# Patient Record
Sex: FEMALE | Race: WHITE | NOT HISPANIC OR LATINO | Employment: FULL TIME | URBAN - METROPOLITAN AREA
[De-identification: names, ages, dates, MRNs, and addresses within clinical notes are randomized per-mention and may not be internally consistent; named-entity substitution may affect disease eponyms.]

---

## 2017-10-13 ENCOUNTER — ALLSCRIPTS OFFICE VISIT (OUTPATIENT)
Dept: OTHER | Facility: OTHER | Age: 47
End: 2017-10-13

## 2017-10-13 ENCOUNTER — GENERIC CONVERSION - ENCOUNTER (OUTPATIENT)
Dept: OTHER | Facility: OTHER | Age: 47
End: 2017-10-13

## 2017-10-13 ENCOUNTER — APPOINTMENT (OUTPATIENT)
Dept: RADIOLOGY | Facility: CLINIC | Age: 47
End: 2017-10-13
Payer: COMMERCIAL

## 2017-10-13 DIAGNOSIS — M54.2 CERVICALGIA: ICD-10-CM

## 2017-10-13 PROCEDURE — 72040 X-RAY EXAM NECK SPINE 2-3 VW: CPT

## 2018-01-15 NOTE — RESULT NOTES
Verified Results  (LC) PapIG, HPV, rfx 16/18 [id number] 53:10HB Sonja Mason   No  of containers  Yunior Nuñez 01 CYTYC Thin Prep Vial     Test Name Result Flag Reference   DIAGNOSIS: Comment     NEGATIVE FOR INTRAEPITHELIAL LESION AND MALIGNANCY  THE CYTOLOGY PROCESSING WAS PERFORMED AT THE [facility] LOCATED AT  [facility], [address] [place] [address], [address] [place] [id number]  Specimen adequacy: Comment     Satisfactory for evaluation  Endocervical and/or squamous metaplastic  cells (endocervical component) are present  Clinician provided ICD10: Comment     Z01 419   Performed by: Yajaira Mcdaniel Cytotechnologist (ASCP)     Yunior Nuñez Note: Comment     The Pap smear is a screening test designed to aid in the detection of  premalignant and malignant conditions of the uterine cervix  It is not a  diagnostic procedure and should not be used as the sole means of detecting  cervical cancer  Both false-positive and false-negative reports do occur  Test Methodology: Comment     This liquid based ThinPrep(R) pap test was screened with the  use of an image guided system  HPV, high-risk Negative  Negative   This high-risk HPV test detects thirteen high-risk types  (16/18/31/33/35/39/45/51/52/56/58/59/68) without differentiation

## 2018-01-22 VITALS
SYSTOLIC BLOOD PRESSURE: 128 MMHG | HEIGHT: 62 IN | WEIGHT: 170 LBS | DIASTOLIC BLOOD PRESSURE: 82 MMHG | BODY MASS INDEX: 31.28 KG/M2 | HEART RATE: 83 BPM

## 2018-04-17 ENCOUNTER — ANNUAL EXAM (OUTPATIENT)
Dept: OBGYN CLINIC | Facility: CLINIC | Age: 48
End: 2018-04-17
Payer: COMMERCIAL

## 2018-04-17 VITALS
BODY MASS INDEX: 34.12 KG/M2 | HEIGHT: 62 IN | SYSTOLIC BLOOD PRESSURE: 138 MMHG | WEIGHT: 185.4 LBS | DIASTOLIC BLOOD PRESSURE: 86 MMHG

## 2018-04-17 DIAGNOSIS — N32.81 OAB (OVERACTIVE BLADDER): ICD-10-CM

## 2018-04-17 DIAGNOSIS — Z12.39 SCREENING FOR MALIGNANT NEOPLASM OF BREAST: ICD-10-CM

## 2018-04-17 DIAGNOSIS — Z01.419 WELL WOMAN EXAM WITH ROUTINE GYNECOLOGICAL EXAM: Primary | ICD-10-CM

## 2018-04-17 PROBLEM — M54.2 NECK PAIN: Status: ACTIVE | Noted: 2017-10-13

## 2018-04-17 PROCEDURE — 99396 PREV VISIT EST AGE 40-64: CPT | Performed by: OBSTETRICS & GYNECOLOGY

## 2018-04-17 RX ORDER — OXYBUTYNIN CHLORIDE 10 MG/1
10 TABLET, EXTENDED RELEASE ORAL
Qty: 90 TABLET | Refills: 3 | Status: SHIPPED | OUTPATIENT
Start: 2018-04-17 | End: 2019-02-20

## 2018-04-17 RX ORDER — FAMOTIDINE 40 MG/1
TABLET, FILM COATED ORAL
COMMUNITY
End: 2019-10-22 | Stop reason: ALTCHOICE

## 2018-04-17 RX ORDER — LORATADINE 10 MG/1
CAPSULE, LIQUID FILLED ORAL DAILY
COMMUNITY
End: 2019-10-22 | Stop reason: ALTCHOICE

## 2018-04-17 RX ORDER — EPINEPHRINE 0.3 MG/.3ML
INJECTION SUBCUTANEOUS
COMMUNITY
Start: 2016-04-05 | End: 2019-10-22 | Stop reason: ALTCHOICE

## 2018-04-17 NOTE — PROGRESS NOTES
ASSESSMENT & PLAN: Ana Paula Reyes is a 56 y o  No obstetric history on file  with normal gynecologic exam     1   Routine well woman exam done today  2   Pap and HPV:Pap with HPV was not done today  Current ASCCP Guidelines reviewed  3   Mammogram ordered  Recommend yearly mammography  4   The patient declined STD testing  No testing performed  Safe sex practices have been discussed  5  The patient is sexually active  She relies on partner's vasectomy for contraception  6  The following were reviewed in today's visit: breast self exam, mammography screening ordered, exercise and healthy diet  7  Patient to return to office in 12 months for annual exam    8  Overactive bladder - will send in RX - oxybutynin  All questions have been answered to her satisfaction  CC:  Annual Gynecologic Examination    HPI: Ana Paula Reyes is a 56 y o  No obstetric history on file  who presents for annual gynecologic examination  She has the following concerns:  None  Health Maintenance:    She exercises 5 days per week  She wears her seatbelt routinely  She does not perform regular monthly self breast exams  She feels safe at home  Patients does not always follow a balanced diet  Past Medical History:   Diagnosis Date    Abnormal Pap smear of cervix     Asthma     Depression     Migraine        Past Surgical History:   Procedure Laterality Date     SECTION      SINUS SURGERY  2000    TONSILLECTOMY      in 25s       Past OB/Gyn History:  Period Cycle (Days): 30  Period Duration (Days): 5-7  Period Pattern: Regular  Menstrual Flow: Heavy, Moderate  Menstrual Control: Maxi pad, Tampon  Dysmenorrhea: (!) Moderate  Dysmenorrhea Symptoms: HeadachePatient's last menstrual period was 2018       Menstrual History:  OB History      Para Term  AB Living    2 2 2          SAB TAB Ectopic Multiple Live Births                       Patient's last menstrual period was 03/29/2018  Period Cycle (Days): 30  Period Duration (Days): 5-7  Period Pattern: Regular  Menstrual Flow: Heavy, Moderate  Menstrual Control: Maxi pad, Tampon  Dysmenorrhea: (!) Moderate  Dysmenorrhea Symptoms: Headache    History of sexually transmitted infection No  Patient is currently sexually active: heterosexual  Birth control: vasectomy  Last Pap  2016:  no abnormalities; HPV negative    Family History:  Family History   Problem Relation Age of Onset    Hypertension Mother     Diabetes Paternal Grandmother     Diabetes Paternal Grandfather        Social History:  Social History     Social History    Marital status: /Civil Union     Spouse name: N/A    Number of children: N/A    Years of education: N/A     Occupational History    Not on file  Social History Main Topics    Smoking status: Former Smoker    Smokeless tobacco: Never Used    Alcohol use Yes      Comment: social    Drug use: No    Sexual activity: Yes     Partners: Male     Birth control/ protection: Male Sterilization     Other Topics Concern    Not on file     Social History Narrative    No narrative on file     Presently lives with   Patient is   Patient is currently employed  Allergies:   Allergies   Allergen Reactions    Aspirin Allergic Rhinitis    Citric Acid Anhydrous [Citric Acid] Allergic Rhinitis    Corn-Containing Products Allergic Rhinitis    Ibuprofen Allergic Rhinitis    Isoflavones Allergic Rhinitis    Naproxen Allergic Rhinitis       Medications:    Current Outpatient Prescriptions:     EPINEPHrine (EPIPEN 2-JUANITO) 0 3 mg/0 3 mL SOAJ, Inject as directed, Disp: , Rfl:     famotidine (PEPCID) 40 MG tablet, Take by mouth, Disp: , Rfl:     Loratadine (CLARITIN) 10 MG CAPS, Take by mouth daily, Disp: , Rfl:     Triamcinolone Acetonide (NASACORT AQ NA), into each nostril, Disp: , Rfl:     Review of Systems:  A complete review of systems was performed and was negative, except as listed  Denies weight changes, excessive fatigue, breast lumps or changes, urinary incontinence, urinary frequency, constipation or bowel changes, blood in stool, severe headaches, vaginal bleeding, vaginal discharge  Struggling with weight gain - wasn't working out with plantar fascitis, doing light exercise  Physical Exam:  /86   Ht 5' 1 5" (1 562 m)   Wt 84 1 kg (185 lb 6 4 oz)   LMP 03/29/2018   BMI 34 46 kg/m²    GEN: The patient was alert and oriented x3, pleasant well-appearing female in no acute distress  HEENT:  Unremarkable, no anterior or posterior lymphadenopathy, no thyromegaly  CV:  RRR, no murmurs  RESP:  Clear to auscultation bilaterally  BREAST:  Symmetric breasts with no palpable breast masses or obvious breast lesions  She has no retractions or nipple discharge  She has no axillary abnormalities or palpable masses  Self breast exam is taught  ABD:  Soft, nontender, non-distended  EXT: nontender, no edema  PELVIC:  Normal appearing external female genitalia, normal vaginal epithelium, No discharge  Cervix present   Bimanual: absent CMT,  normal uterus, non-tender  No palpable adnexal masses  Pap was not collected

## 2018-05-22 ENCOUNTER — OFFICE VISIT (OUTPATIENT)
Dept: FAMILY MEDICINE CLINIC | Facility: CLINIC | Age: 48
End: 2018-05-22
Payer: COMMERCIAL

## 2018-05-22 VITALS
BODY MASS INDEX: 34.23 KG/M2 | SYSTOLIC BLOOD PRESSURE: 124 MMHG | RESPIRATION RATE: 16 BRPM | HEIGHT: 62 IN | DIASTOLIC BLOOD PRESSURE: 76 MMHG | HEART RATE: 84 BPM | WEIGHT: 186 LBS | TEMPERATURE: 98.3 F

## 2018-05-22 DIAGNOSIS — Z13.6 SCREENING FOR CARDIOVASCULAR CONDITION: ICD-10-CM

## 2018-05-22 DIAGNOSIS — R53.83 FATIGUE, UNSPECIFIED TYPE: ICD-10-CM

## 2018-05-22 DIAGNOSIS — Z00.00 ANNUAL PHYSICAL EXAM: Primary | ICD-10-CM

## 2018-05-22 PROCEDURE — 99396 PREV VISIT EST AGE 40-64: CPT | Performed by: FAMILY MEDICINE

## 2018-05-22 NOTE — PROGRESS NOTES
FAMILY PRACTICE HEALTH MAINTENANCE OFFICE VISIT  [facility] - [address]    NAME: Malik Goldsmith  AGE: 53 y o  SEX: female  : 1970     DATE: 2018    Assessment and Plan     Problem List Items Addressed This Visit     Fatigue    Relevant Orders    CBC    TSH, 3rd generation    Lyme Antibody Profile with reflex to WB      Other Visit Diagnoses     Annual physical exam    -  Primary    Screening for cardiovascular condition        Relevant Orders    Comprehensive metabolic panel    Lipid Panel with Direct LDL reflex            · Patient Counseling:   · Nutrition: Stressed importance of a well balanced diet, moderation of sodium/saturated fat, caloric balance and sufficient intake of fiber  · Exercise: Stressed the importance of regular exercise with a goal of 150 minutes per week  · Dental Health: Discussed daily flossing and brushing and regular dental visits     · Immunizations reviewed  Up to date  · Discussed benefits of screening mammogram, she just had it     · She has been struggling with her weight due to not being able to exercise from her foot pain  Return in about 1 year (around 2019) for Annual physical         Chief Complaint     Chief Complaint   Patient presents with    Physical Exam     lj       History of Present Illness     She is feeling well other than foot pain  She is seeing a podiatrist regarding it  Well Adult Physical   Patient here for a comprehensive physical exam       Diet and Physical Activity  Diet: well balanced diet  Weight concerns: Patient has class 1 obesity (BMI 30-34  9)  Exercise: frequently      Depression Screen  PHQ-9 Depression Screening    PHQ-9:    Frequency of the following problems over the past two weeks:       Little interest or pleasure in doing things:  1 - several days  Feeling down, depressed, or hopeless:  1 - several days  Trouble falling or staying asleep, or sleeping too much:  1 - several days  Feeling tired or having little energy:  1 - several days  Poor appetite or overeatin - not at all  Feeling bad about yourself - or that you are a failure or have let yourself or your family down:  0 - not at all  Trouble concentrating on things, such as reading the newspaper or watching television:  0 - not at all  Moving or speaking so slowly that other people could have noticed  Or the opposite - being so fidgety or restless that you have been moving around a lot more than usual:  0 - not at all  Thoughts that you would be better off dead, or of hurting yourself in some way:  0 - not at all  PHQ-2 Score:  2  PHQ-9 Score:  4          General Health  Hearing: Normal:  bilateral  Vision: wears glasses  Dental: regular dental visits    Reproductive Health  Recently had her mamm[facility] at [facility]  The following portions of the patient's history were reviewed and updated as appropriate: allergies, current medications, past family history, past medical history, past social history, past surgical history and problem list     Review of Systems     Review of Systems   Constitutional: Positive for fatigue  Respiratory: Negative  Cardiovascular: Negative  Musculoskeletal: Negative for arthralgias         Past Medical History     Past Medical History:   Diagnosis Date    Abnormal Pap smear of cervix     Asthma     Depression     Migraine        Past Surgical History     Past Surgical History:   Procedure Laterality Date     SECTION      SINUS SURGERY  2000    TONSILLECTOMY      in 25s       Social History     Social History     Social History    Marital status: /Civil Union     Spouse name: N/A    Number of children: N/A    Years of education: N/A     Social History Main Topics    Smoking status: Former Smoker    Smokeless tobacco: Never Used    Alcohol use Yes      Comment: social    Drug use: No    Sexual activity: Yes     Partners: Male     Birth control/ protection: Male Sterilization     Other Topics Concern    None     Social History Narrative    Exercise habits: denied        Family History     Family History   Problem Relation Age of Onset    Hypertension Mother     Brain cancer Father     Diabetes Paternal Grandmother     Diabetes Paternal Grandfather        Current Medications       Current Outpatient Prescriptions:     EPINEPHrine (EPIPEN 2-JUANITO) 0 3 mg/0 3 mL SOAJ, Inject as directed, Disp: , Rfl:     famotidine (PEPCID) 40 MG tablet, Take by mouth, Disp: , Rfl:     Loratadine (CLARITIN) 10 MG CAPS, Take by mouth daily, Disp: , Rfl:     oxybutynin (DITROPAN-XL) 10 MG 24 hr tablet, Take 1 tablet (10 mg total) by mouth daily at bedtime, Disp: 90 tablet, Rfl: 3    Triamcinolone Acetonide (NASACORT AQ NA), into each nostril, Disp: , Rfl:      Allergies     Allergies   Allergen Reactions    Aspirin Allergic Rhinitis    Citric Acid Anhydrous [Citric Acid] Allergic Rhinitis    Corn-Containing Products Allergic Rhinitis    Ibuprofen Allergic Rhinitis    Isoflavones Allergic Rhinitis    Naproxen Allergic Rhinitis       Objective     /76   Pulse 84   Temp 98 3 °F (36 8 °C)   Resp 16   Ht 5' 2" (1 575 m)   Wt 84 4 kg (186 lb)   LMP 05/18/2018   BMI 34 02 kg/m²      Physical Exam   Constitutional: She is oriented to person, place, and time  She appears well-developed and well-nourished  HENT:   Head: Normocephalic and atraumatic  Right Ear: External ear normal    Left Ear: External ear normal    Mouth/Throat: Oropharynx is clear and moist    Eyes: Pupils are equal, round, and reactive to light  Neck: Normal range of motion  Cardiovascular: Normal rate, regular rhythm and normal heart sounds  Exam reveals no friction rub  No murmur heard  Pulmonary/Chest: Effort normal and breath sounds normal  No respiratory distress  She has no wheezes  She has no rales  Abdominal: Soft  Bowel sounds are normal  She exhibits no distension and no mass   There is no tenderness  There is no rebound and no guarding  Musculoskeletal: Normal range of motion  She exhibits no edema  Neurological: She is alert and oriented to person, place, and time  She has normal reflexes  Skin: Skin is warm  Nursing note and vitals reviewed          Vision declined - follows with eye doctor    Health Maintenance     Health Maintenance   Topic Date Due    HIV SCREENING  1970    MAMMOGRAM  1970    PNEUMOCOCCAL POLYSACCHARIDE VACCINE AGE 2-64 HIGH RISK  04/24/1973    Depression Screening PHQ-9  04/24/1983    DTaP,Tdap,and Td Vaccines (1 - Tdap) 04/24/1992    INFLUENZA VACCINE  09/01/2018    PAP SMEAR  10/27/2019     Immunization History   Administered Date(s) Administered    TD (adult) Preservative Free 06/18/2012       Madison Wilson   [address] \[facility] [place] [facility]\""

## 2019-02-20 ENCOUNTER — APPOINTMENT (EMERGENCY)
Dept: RADIOLOGY | Facility: HOSPITAL | Age: 49
End: 2019-02-20
Payer: COMMERCIAL

## 2019-02-20 ENCOUNTER — HOSPITAL ENCOUNTER (EMERGENCY)
Facility: HOSPITAL | Age: 49
Discharge: HOME | End: 2019-02-20
Attending: EMERGENCY MEDICINE | Admitting: EMERGENCY MEDICINE
Payer: COMMERCIAL

## 2019-02-20 VITALS
BODY MASS INDEX: 32.57 KG/M2 | RESPIRATION RATE: 18 BRPM | DIASTOLIC BLOOD PRESSURE: 67 MMHG | HEART RATE: 81 BPM | OXYGEN SATURATION: 100 % | SYSTOLIC BLOOD PRESSURE: 125 MMHG | WEIGHT: 177 LBS | TEMPERATURE: 98.6 F | HEIGHT: 62 IN

## 2019-02-20 DIAGNOSIS — S01.01XA SCALP LACERATION: Primary | ICD-10-CM

## 2019-02-20 DIAGNOSIS — R42 VERTIGO: ICD-10-CM
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
## 2019-02-20 PROCEDURE — 96375 TX/PRO/DX INJ NEW DRUG ADDON: CPT

## 2019-02-20 PROCEDURE — 70450 CT HEAD/BRAIN W/O DYE: CPT

## 2019-02-20 PROCEDURE — 96361 HYDRATE IV INFUSION ADD-ON: CPT

## 2019-02-20 PROCEDURE — 96374 THER/PROPH/DIAG INJ IV PUSH: CPT

## 2019-02-20 PROCEDURE — 99284 EMERGENCY DEPT VISIT MOD MDM: CPT

## 2019-02-20 RX ORDER — MECLIZINE HYDROCHLORIDE 25 MG/1
25 TABLET ORAL ONCE
Status: COMPLETED | OUTPATIENT
Start: 2019-02-20 | End: 2019-02-20

## 2019-02-20 RX ORDER — MECLIZINE HYDROCHLORIDE 25 MG/1
25 TABLET ORAL 3 TIMES DAILY PRN
Qty: 30 TABLET | Refills: 0 | Status: SHIPPED | OUTPATIENT
Start: 2019-02-20 | End: 2019-10-22 | Stop reason: ALTCHOICE

## 2019-02-20 RX ORDER — ACETAMINOPHEN 325 MG/1
650 TABLET ORAL ONCE
Status: COMPLETED | OUTPATIENT
Start: 2019-02-20 | End: 2019-02-20

## 2019-02-20 RX ORDER — METOCLOPRAMIDE HYDROCHLORIDE 5 MG/ML
10 INJECTION INTRAMUSCULAR; INTRAVENOUS ONCE
Status: COMPLETED | OUTPATIENT
Start: 2019-02-20 | End: 2019-02-20

## 2019-02-20 RX ORDER — DIAZEPAM 5 MG/ML
5 INJECTION, SOLUTION INTRAMUSCULAR; INTRAVENOUS ONCE
Status: COMPLETED | OUTPATIENT
Start: 2019-02-20 | End: 2019-02-20

## 2019-02-20 RX ORDER — KETOROLAC TROMETHAMINE 30 MG/ML
15 INJECTION, SOLUTION INTRAMUSCULAR; INTRAVENOUS ONCE
Status: COMPLETED | OUTPATIENT
Start: 2019-02-20 | End: 2019-02-20

## 2019-02-20 RX ORDER — ONDANSETRON 4 MG/1
4 TABLET, FILM COATED ORAL EVERY 6 HOURS
Qty: 12 TABLET | Refills: 0 | Status: SHIPPED | OUTPATIENT
Start: 2019-02-20 | End: 2019-08-15

## 2019-02-20 RX ORDER — LIDOCAINE HYDROCHLORIDE AND EPINEPHRINE 10; 10 MG/ML; UG/ML
10 INJECTION, SOLUTION INFILTRATION; PERINEURAL ONCE
Status: COMPLETED | OUTPATIENT
Start: 2019-02-20 | End: 2019-02-20

## 2019-02-20 RX ORDER — ONDANSETRON 4 MG/1
4 TABLET, ORALLY DISINTEGRATING ORAL ONCE
Status: COMPLETED | OUTPATIENT
Start: 2019-02-20 | End: 2019-02-20

## 2019-02-20 RX ORDER — ONDANSETRON 2 MG/ML
4 INJECTION INTRAMUSCULAR; INTRAVENOUS ONCE
Status: COMPLETED | OUTPATIENT
Start: 2019-02-20 | End: 2019-02-20

## 2019-02-20 RX ADMIN — SODIUM CHLORIDE 1000 ML: 0.9 INJECTION, SOLUTION INTRAVENOUS at 14:45

## 2019-02-20 RX ADMIN — ONDANSETRON 4 MG: 4 TABLET, ORALLY DISINTEGRATING ORAL at 14:30

## 2019-02-20 RX ADMIN — Medication 5 MG: at 15:03

## 2019-02-20 RX ADMIN — MECLIZINE HYDROCHLORIDE 25 MG: 25 TABLET ORAL at 14:31

## 2019-02-20 RX ADMIN — ONDANSETRON 4 MG: 2 INJECTION INTRAMUSCULAR; INTRAVENOUS at 13:11

## 2019-02-20 RX ADMIN — LIDOCAINE HYDROCHLORIDE,EPINEPHRINE BITARTRATE 10 ML: 10; .01 INJECTION, SOLUTION INFILTRATION; PERINEURAL at 13:15

## 2019-02-20 RX ADMIN — METOCLOPRAMIDE 10 MG: 5 INJECTION, SOLUTION INTRAMUSCULAR; INTRAVENOUS at 15:07

## 2019-02-20 RX ADMIN — ACETAMINOPHEN 650 MG: 325 TABLET, FILM COATED ORAL at 14:31

## 2019-02-20 RX ADMIN — KETOROLAC TROMETHAMINE 15 MG: 30 INJECTION, SOLUTION INTRAMUSCULAR at 15:06

## 2019-02-20 NOTE — ED PROVIDER NOTES
History  Chief Complaint   Patient presents with    Fall     fell on ice today, hit back of head  no loc  no thinners     HPI    53 year female that presents after a fall  Patient states she was taking out the trash she slid on ice the back of her head states she blacked out for a few seconds  Patient does not take any blood thinners  Patient does have a laceration to the back of her head  States she has severe headache along with nausea  Denies any chest pain no back pain  No neck pain  No other complaints  Denies any lightheaded or dizziness  53 year female that presents today after a fall  Tetanus is up-to-date  Will do a CT head along with lack repair  Prior to Admission Medications   Prescriptions Last Dose Informant Patient Reported? Taking? EPINEPHrine (EPIPEN 2-JUANITO) 0 3 mg/0 3 mL SOAJ  Self Yes No   Sig: Inject as directed   Loratadine (CLARITIN) 10 MG CAPS  Self Yes No   Sig: Take by mouth daily   Triamcinolone Acetonide (NASACORT AQ NA)  Self Yes No   Sig: into each nostril   famotidine (PEPCID) 40 MG tablet  Self Yes No   Sig: Take by mouth      Facility-Administered Medications: None       Past Medical History:   Diagnosis Date    Abnormal Pap smear of cervix     Asthma     Depression     Migraine        Past Surgical History:   Procedure Laterality Date     SECTION      SINUS SURGERY  2000    TONSILLECTOMY      in 25s       Family History   Problem Relation Age of Onset    Hypertension Mother     Brain cancer Father     Diabetes Paternal Grandmother     Diabetes Paternal Grandfather      I have reviewed and agree with the history as documented  Social History     Tobacco Use    Smoking status: Former Smoker    Smokeless tobacco: Never Used   Substance Use Topics    Alcohol use: Yes     Comment: social    Drug use: No        Review of Systems   Constitutional: Negative  Negative for diaphoresis and fever  HENT: Negative  Respiratory: Negative    Negative for cough, shortness of breath and wheezing  Cardiovascular: Negative  Negative for chest pain, palpitations and leg swelling  Gastrointestinal: Positive for nausea  Negative for abdominal distention, abdominal pain and vomiting  Genitourinary: Negative  Musculoskeletal: Negative  Skin: Positive for wound  Neurological: Positive for headaches  Psychiatric/Behavioral: Negative  All other systems reviewed and are negative  Physical Exam  Physical Exam   Constitutional: She is oriented to person, place, and time  She appears well-developed and well-nourished  No distress  HENT:   Head: Normocephalic  3 cm linear laceration not bleeding on the occiput with a small hematoma  Eyes: Pupils are equal, round, and reactive to light  EOM are normal    Neck: Normal range of motion  Neck supple  No midline neck tenderness   Cardiovascular: Normal rate, regular rhythm and normal heart sounds  No murmur heard  Pulmonary/Chest: Effort normal and breath sounds normal    Abdominal: Soft  Bowel sounds are normal  She exhibits no distension  There is no tenderness  There is no rebound and no guarding  Musculoskeletal: Normal range of motion  She exhibits no deformity  Neurological: She is alert and oriented to person, place, and time  Normal motor and sensory exam   Skin: Skin is warm and dry  Capillary refill takes less than 2 seconds  She is not diaphoretic  Psychiatric: She has a normal mood and affect  Vitals reviewed        Vital Signs  ED Triage Vitals   Temperature Pulse Respirations Blood Pressure SpO2   02/20/19 1251 02/20/19 1251 02/20/19 1251 02/20/19 1251 02/20/19 1251   98 6 °F (37 °C) 82 20 152/74 100 %      Temp Source Heart Rate Source Patient Position - Orthostatic VS BP Location FiO2 (%)   02/20/19 1251 02/20/19 1251 02/20/19 1251 02/20/19 1251 --   Tympanic Monitor Sitting Left arm       Pain Score       02/20/19 1300       8           Vitals:    02/20/19 1251 02/20/19 1551 02/20/19 1730   BP: 152/74 137/74 125/67   Pulse: 82 81    Patient Position - Orthostatic VS: Sitting Lying Sitting       Visual Acuity  Visual Acuity      Most Recent Value   L Pupil Size (mm)  3   R Pupil Size (mm)  3          ED Medications  Medications   lidocaine-epinephrine (XYLOCAINE/EPINEPHRINE) 1 %-1:100,000 injection 10 mL (10 mL Infiltration Given 2/20/19 1315)   ondansetron (ZOFRAN) injection 4 mg (4 mg Intravenous Given 2/20/19 1311)   acetaminophen (TYLENOL) tablet 650 mg (650 mg Oral Given 2/20/19 1431)   meclizine (ANTIVERT) tablet 25 mg (25 mg Oral Given 2/20/19 1431)   ondansetron (ZOFRAN-ODT) dispersible tablet 4 mg (4 mg Oral Given 2/20/19 1430)   sodium chloride 0 9 % bolus 1,000 mL (0 mL Intravenous Stopped 2/20/19 1710)   metoclopramide (REGLAN) injection 10 mg (10 mg Intravenous Given 2/20/19 1507)   ketorolac (TORADOL) injection 15 mg (15 mg Intravenous Given 2/20/19 1506)   diazepam (VALIUM) injection 5 mg (5 mg Intravenous Given 2/20/19 1503)       Diagnostic Studies  Results Reviewed     None                 CT head without contrast   Final Result by Jacques Gray MD (02/20 1357)      No acute intracranial abnormality  Pansinus disease  Workstation performed: [id number]                    Procedures  Lac Repair  Date/Time: 2/20/2019 2:01 PM  Performed by: Lorie Garduno MD  Authorized by: Lorie Garduno MD   Consent: Verbal consent obtained    Consent given by: patient  Body area: head/neck  Location details: scalp  Laceration length: 3 cm  Anesthesia: local infiltration    Anesthesia:  Local Anesthetic: lidocaine 1% with epinephrine      Procedure Details:  Irrigation solution: saline  Irrigation method: syringe  Amount of cleaning: extensive  Skin closure: staples  Number of sutures: 2  Dressing: 4x4 sterile gauze  Patient tolerance: Patient tolerated the procedure well with no immediate complications             Phone Contacts  ED Phone Contact    ED Course  ED Course as of Feb 23 0701 Wed Feb 20, 2019   1353 2 staples placed       1436 Patient currently vomiting will get IV and give fluids and antiemetics       1447 Patient states this is her vertigo acting up       1601 Patient feeling better, wants to eat crackers                                   MDM    Disposition  Final diagnoses:   Scalp laceration   Fall from slipping on ice   Vertigo     Time reflects when diagnosis was documented in both MDM as applicable and the Disposition within this note     Time User Action Codes Description Comment    2/20/2019  2:00 PM Branden Bro Add [S01 01XA] Scalp laceration     2/20/2019  2:00 PM Mary Bro U  8  [W00  9XXA] Fall from slipping on ice     2/20/2019  3:59 PM Nathaniel Charles Add [R42] Vertigo       ED Disposition     ED Disposition Condition Date/Time Comment    Discharge Stable Wed Feb 20, 2019  2:00 PM Sameera Julio discharge to home/self care  Follow-up Information     Follow up With Specialties Details Why Contact Info    Priscila Thornton DO Family Medicine Go to  For suture removal in 5-8 days [address] [facility]  [phone]            Discharge Medication List as of 2/20/2019  2:00 PM      CONTINUE these medications which have NOT CHANGED    Details   EPINEPHrine (EPIPEN 2-JUANITO) 0 3 mg/0 3 mL SOAJ Inject as directed, Starting Tue 4/5/2016, Historical Med      famotidine (PEPCID) 40 MG tablet Take by mouth, Historical Med      Loratadine (CLARITIN) 10 MG CAPS Take by mouth daily, Historical Med      Triamcinolone Acetonide (NASACORT AQ NA) into each nostril, Historical Med           No discharge procedures on file      ED Provider  Electronically Signed by           Carl Velez MD  02/23/19 8915

## 2019-02-21 ENCOUNTER — TELEPHONE (OUTPATIENT)
Dept: ADMINISTRATIVE | Facility: OTHER | Age: 49
End: 2019-02-21

## 2019-02-21 NOTE — TELEPHONE ENCOUNTER
Spoke to  tierra  I Asked how pt was doing since her visit yesterday at the ED  He stated that she is still experiencing dizziness, no vomiting or nausea  If she lays on her back does not get dizzy, if she roles to the right does not get dizzy, if she role to the left pt feels dizzy  If she stands up for a long period of time she becomes dizzy  She was given antivert for dizziness so he is on his was to fill it now  Mango Quiroz says that since yesterday she is more alert and has more enery but dizziness is still an issue  I suggested to go ahead with the antivert to see if that would help and if there are any changes in her well being to call us back and if she gets worse on our off office hours to go ahead and take her to the emergency room again    They have an appt with Dr Morrison  3/1/2019 for suture removal and ED f/u   sending to Dr Sole Cardoza for [place], [place]

## 2019-02-21 NOTE — TELEPHONE ENCOUNTER
Kulwinder Calderón    ED Visit Information     Ed visit date: 02/20/2019  Diagnosis Description: Scalp laceration; Fall from slipping on ice; Vertigo  In Network? Yes [facility]  Discharge status: Home  Discharged with meds ? No  Number of ED visits to date: 0  ED Severity:N/A     Outreach Information    Outreach successful: Yes 1  Date letter mailed:N/A  Date Finalized:02/21/2019    Care Coordination    Follow up appointment with pcp: yes yes  Transportation issues ? NA    [employer] Outreach    S/W patient , patient was in the background speaking  He said that patient was still in bed, she gets dizzy and nauseous when she tries to stand up   declined my offer to have Clinical Staff call him but he did make a follow up appointment for the suture removal   advised to call [facility] 24/7 with questions/concerns  After hanging up I was uneasy about closing this follow up call and sent message to [facility] Staff as patient hit head hard enough to open skin and require 2 staples

## 2019-03-01 ENCOUNTER — OFFICE VISIT (OUTPATIENT)
Dept: FAMILY MEDICINE CLINIC | Facility: CLINIC | Age: 49
End: 2019-03-01
Payer: COMMERCIAL

## 2019-03-01 VITALS
DIASTOLIC BLOOD PRESSURE: 76 MMHG | SYSTOLIC BLOOD PRESSURE: 124 MMHG | HEIGHT: 62 IN | BODY MASS INDEX: 33.86 KG/M2 | HEART RATE: 84 BPM | RESPIRATION RATE: 16 BRPM | WEIGHT: 184 LBS | TEMPERATURE: 97.4 F

## 2019-03-01 DIAGNOSIS — S06.0X0D CONCUSSION WITHOUT LOSS OF CONSCIOUSNESS, SUBSEQUENT ENCOUNTER: ICD-10-CM

## 2019-03-01 DIAGNOSIS — Z13.6 SCREENING FOR CARDIOVASCULAR CONDITION: ICD-10-CM

## 2019-03-01 DIAGNOSIS — S01.01XD LACERATION OF SCALP, SUBSEQUENT ENCOUNTER: Primary | ICD-10-CM

## 2019-03-01 DIAGNOSIS — Z79.899 ENCOUNTER FOR LONG-TERM CURRENT USE OF MEDICATION: ICD-10-CM

## 2019-03-01 PROCEDURE — 3008F BODY MASS INDEX DOCD: CPT | Performed by: FAMILY MEDICINE

## 2019-03-01 PROCEDURE — 99213 OFFICE O/P EST LOW 20 MIN: CPT | Performed by: FAMILY MEDICINE

## 2019-03-01 PROCEDURE — 1036F TOBACCO NON-USER: CPT | Performed by: FAMILY MEDICINE

## 2019-03-01 NOTE — LETTER
March 1, 2019     Patient: Anatoly Burrell   YOB: 1970   Date of Visit: 3/1/2019       To Whom it May Concern:    Anatoly Burrell is under my professional care  She was seen in my office on 3/1/2019  No lifting greater than 5 pounds for 3/4/19 - 3/15/19  If you have any questions or concerns, please don't hesitate to call           Sincerely,          Rosita Roselyn, DO        CC: No Recipients

## 2019-04-24 ENCOUNTER — TELEPHONE (OUTPATIENT)
Dept: FAMILY MEDICINE CLINIC | Facility: CLINIC | Age: 49
End: 2019-04-24

## 2019-04-24 DIAGNOSIS — K62.89 ANAL OR RECTAL PAIN: Primary | ICD-10-CM

## 2019-04-29 ENCOUNTER — OFFICE VISIT (OUTPATIENT)
Dept: PODIATRY | Facility: CLINIC | Age: 49
End: 2019-04-29
Payer: COMMERCIAL

## 2019-04-29 VITALS
HEIGHT: 62 IN | WEIGHT: 184 LBS | BODY MASS INDEX: 33.86 KG/M2 | RESPIRATION RATE: 16 BRPM | SYSTOLIC BLOOD PRESSURE: 125 MMHG | DIASTOLIC BLOOD PRESSURE: 83 MMHG

## 2019-04-29 DIAGNOSIS — M21.962 ACQUIRED DEFORMITY OF LEFT FOOT: ICD-10-CM

## 2019-04-29 DIAGNOSIS — M72.2 PLANTAR FASCIITIS: Primary | ICD-10-CM

## 2019-04-29 DIAGNOSIS — M79.671 PAIN IN BOTH FEET: ICD-10-CM

## 2019-04-29 DIAGNOSIS — Q66.51 CONGENITAL PES PLANUS OF RIGHT FOOT: ICD-10-CM

## 2019-04-29 DIAGNOSIS — M79.672 PAIN IN BOTH FEET: ICD-10-CM

## 2019-04-29 DIAGNOSIS — Q66.52 CONGENITAL PES PLANUS OF LEFT FOOT: ICD-10-CM

## 2019-04-29 PROCEDURE — L3000 FT INSERT UCB BERKELEY SHELL: HCPCS | Performed by: PODIATRIST

## 2019-04-29 PROCEDURE — 99203 OFFICE O/P NEW LOW 30 MIN: CPT | Performed by: PODIATRIST

## 2019-04-29 PROCEDURE — 73620 X-RAY EXAM OF FOOT: CPT | Performed by: PODIATRIST

## 2019-04-29 PROCEDURE — 20550 NJX 1 TENDON SHEATH/LIGAMENT: CPT | Performed by: PODIATRIST

## 2019-04-29 RX ORDER — TRAMADOL HYDROCHLORIDE 50 MG/1
TABLET ORAL
Qty: 30 TABLET | Refills: 0 | Status: SHIPPED | OUTPATIENT
Start: 2019-04-29 | End: 2019-05-29

## 2019-05-13 ENCOUNTER — OFFICE VISIT (OUTPATIENT)
Dept: PODIATRY | Facility: CLINIC | Age: 49
End: 2019-05-13
Payer: COMMERCIAL

## 2019-05-13 VITALS — BODY MASS INDEX: 33.86 KG/M2 | HEIGHT: 62 IN | WEIGHT: 184 LBS | RESPIRATION RATE: 16 BRPM

## 2019-05-13 DIAGNOSIS — M79.671 PAIN IN BOTH FEET: ICD-10-CM

## 2019-05-13 DIAGNOSIS — M72.2 PLANTAR FASCIITIS: Primary | ICD-10-CM

## 2019-05-13 DIAGNOSIS — M79.7 FIBROMYALGIA: ICD-10-CM

## 2019-05-13 DIAGNOSIS — Q66.52 CONGENITAL PES PLANUS OF LEFT FOOT: ICD-10-CM

## 2019-05-13 DIAGNOSIS — Q66.51 CONGENITAL PES PLANUS OF RIGHT FOOT: ICD-10-CM

## 2019-05-13 DIAGNOSIS — M79.672 PAIN IN BOTH FEET: ICD-10-CM

## 2019-05-13 PROCEDURE — 99214 OFFICE O/P EST MOD 30 MIN: CPT | Performed by: PODIATRIST

## 2019-05-13 RX ORDER — GABAPENTIN 100 MG/1
100 CAPSULE ORAL 3 TIMES DAILY
Qty: 90 CAPSULE | Refills: 0 | Status: SHIPPED | OUTPATIENT
Start: 2019-05-13 | End: 2019-06-19 | Stop reason: ALTCHOICE

## 2019-05-13 RX ORDER — METHYLPREDNISOLONE 4 MG/1
TABLET ORAL
Qty: 21 TABLET | Refills: 0 | Status: SHIPPED | OUTPATIENT
Start: 2019-05-13 | End: 2019-06-19 | Stop reason: ALTCHOICE

## 2019-05-25 LAB
ALBUMIN SERPL-MCNC: 4 G/DL (ref 3.5–5.5)
ALBUMIN/GLOB SERPL: 1.4 {RATIO} (ref 1.2–2.2)
ALP SERPL-CCNC: 82 IU/L (ref 39–117)
ALT SERPL-CCNC: 14 IU/L (ref 0–32)
AST SERPL-CCNC: 15 IU/L (ref 0–40)
B BURGDOR IGG+IGM SER-ACNC: <0.91 ISR (ref 0–0.9)
B BURGDOR IGM SER IA-ACNC: <0.8 INDEX (ref 0–0.79)
BASOPHILS # BLD AUTO: 0 X10E3/UL (ref 0–0.2)
BASOPHILS NFR BLD AUTO: 0 %
BILIRUB SERPL-MCNC: 0.3 MG/DL (ref 0–1.2)
BUN SERPL-MCNC: 14 MG/DL (ref 6–24)
BUN/CREAT SERPL: 19 (ref 9–23)
CALCIUM SERPL-MCNC: 9 MG/DL (ref 8.7–10.2)
CHLORIDE SERPL-SCNC: 100 MMOL/L (ref 96–106)
CHOLEST SERPL-MCNC: 187 MG/DL (ref 100–199)
CO2 SERPL-SCNC: 24 MMOL/L (ref 20–29)
CREAT SERPL-MCNC: 0.72 MG/DL (ref 0.57–1)
EOSINOPHIL # BLD AUTO: 0.3 X10E3/UL (ref 0–0.4)
EOSINOPHIL NFR BLD AUTO: 4 %
ERYTHROCYTE [DISTWIDTH] IN BLOOD BY AUTOMATED COUNT: 13.8 % (ref 12.3–15.4)
GLOBULIN SER-MCNC: 2.8 G/DL (ref 1.5–4.5)
GLUCOSE SERPL-MCNC: 122 MG/DL (ref 65–99)
HCT VFR BLD AUTO: 39.4 % (ref 34–46.6)
HDLC SERPL-MCNC: 42 MG/DL
HGB BLD-MCNC: 13.1 G/DL (ref 11.1–15.9)
IMM GRANULOCYTES # BLD: 0 X10E3/UL (ref 0–0.1)
IMM GRANULOCYTES NFR BLD: 1 %
LABCORP COMMENT: NORMAL
LDLC SERPL CALC-MCNC: 106 MG/DL (ref 0–99)
LYMPHOCYTES # BLD AUTO: 1.3 X10E3/UL (ref 0.7–3.1)
LYMPHOCYTES NFR BLD AUTO: 19 %
MCH RBC QN AUTO: 30 PG (ref 26.6–33)
MCHC RBC AUTO-ENTMCNC: 33.2 G/DL (ref 31.5–35.7)
MCV RBC AUTO: 90 FL (ref 79–97)
MICRODELETION SYND BLD/T FISH: NORMAL
MONOCYTES # BLD AUTO: 0.6 X10E3/UL (ref 0.1–0.9)
MONOCYTES NFR BLD AUTO: 9 %
NEUTROPHILS # BLD AUTO: 4.6 X10E3/UL (ref 1.4–7)
NEUTROPHILS NFR BLD AUTO: 67 %
PLATELET # BLD AUTO: 166 X10E3/UL (ref 150–450)
POTASSIUM SERPL-SCNC: 3.9 MMOL/L (ref 3.5–5.2)
PROT SERPL-MCNC: 6.8 G/DL (ref 6–8.5)
RBC # BLD AUTO: 4.37 X10E6/UL (ref 3.77–5.28)
SL AMB EGFR AFRICAN AMERICAN: 115 ML/MIN/1.73
SL AMB EGFR NON AFRICAN AMERICAN: 99 ML/MIN/1.73
SODIUM SERPL-SCNC: 137 MMOL/L (ref 134–144)
TRIGL SERPL-MCNC: 196 MG/DL (ref 0–149)
TSH SERPL DL<=0.005 MIU/L-ACNC: 2.11 UIU/ML (ref 0.45–4.5)
WBC # BLD AUTO: 6.9 X10E3/UL (ref 3.4–10.8)

## 2019-05-27 ENCOUNTER — APPOINTMENT (EMERGENCY)
Dept: RADIOLOGY | Facility: HOSPITAL | Age: 49
End: 2019-05-27
Payer: COMMERCIAL

## 2019-05-27 ENCOUNTER — HOSPITAL ENCOUNTER (EMERGENCY)
Facility: HOSPITAL | Age: 49
Discharge: HOME | End: 2019-05-27
Attending: EMERGENCY MEDICINE | Admitting: EMERGENCY MEDICINE
Payer: COMMERCIAL

## 2019-05-27 VITALS
OXYGEN SATURATION: 98 % | WEIGHT: 175 LBS | BODY MASS INDEX: 32.01 KG/M2 | RESPIRATION RATE: 18 BRPM | SYSTOLIC BLOOD PRESSURE: 153 MMHG | HEART RATE: 88 BPM | DIASTOLIC BLOOD PRESSURE: 79 MMHG | TEMPERATURE: 98.1 F

## 2019-05-27 DIAGNOSIS — S93.609A FOOT SPRAIN: Primary | ICD-10-CM

## 2019-05-27 PROCEDURE — 99283 EMERGENCY DEPT VISIT LOW MDM: CPT

## 2019-05-27 PROCEDURE — 73630 X-RAY EXAM OF FOOT: CPT

## 2019-05-27 PROCEDURE — 73610 X-RAY EXAM OF ANKLE: CPT

## 2019-05-27 RX ORDER — ACETAMINOPHEN 325 MG/1
650 TABLET ORAL ONCE
Status: COMPLETED | OUTPATIENT
Start: 2019-05-27 | End: 2019-05-27

## 2019-05-27 RX ADMIN — ACETAMINOPHEN 650 MG: 325 TABLET, FILM COATED ORAL at 10:02

## 2019-05-28 ENCOUNTER — VBI (OUTPATIENT)
Dept: FAMILY MEDICINE CLINIC | Facility: CLINIC | Age: 49
End: 2019-05-28

## 2019-05-28 LAB
ANA SER QL: NEGATIVE
B BURGDOR IGG+IGM SER-ACNC: <0.91 ISR (ref 0–0.9)
B BURGDOR IGM SER IA-ACNC: <0.8 INDEX (ref 0–0.79)
ERYTHROCYTE [SEDIMENTATION RATE] IN BLOOD BY WESTERGREN METHOD: 6 MM/HR (ref 0–32)
RHEUMATOID FACT SERPL-ACNC: <10 IU/ML (ref 0–13.9)

## 2019-06-06 ENCOUNTER — OFFICE VISIT (OUTPATIENT)
Dept: PODIATRY | Facility: CLINIC | Age: 49
End: 2019-06-06
Payer: COMMERCIAL

## 2019-06-06 VITALS — RESPIRATION RATE: 17 BRPM | WEIGHT: 175 LBS | HEIGHT: 62 IN | BODY MASS INDEX: 32.2 KG/M2

## 2019-06-06 DIAGNOSIS — S93.492A SPRAIN OF ANTERIOR TALOFIBULAR LIGAMENT OF LEFT ANKLE, INITIAL ENCOUNTER: ICD-10-CM

## 2019-06-06 DIAGNOSIS — M62.9 NONTRAUMATIC TEAR OF PLANTAR FASCIA: ICD-10-CM

## 2019-06-06 DIAGNOSIS — M79.672 LEFT FOOT PAIN: Primary | ICD-10-CM

## 2019-06-06 PROBLEM — S93.602A SPRAIN OF LEFT FOOT: Status: ACTIVE | Noted: 2019-06-06

## 2019-06-06 PROCEDURE — 99213 OFFICE O/P EST LOW 20 MIN: CPT | Performed by: PODIATRIST

## 2019-06-06 PROCEDURE — L1902 AFO ANKLE GAUNTLET PRE OTS: HCPCS | Performed by: PODIATRIST

## 2019-06-06 RX ORDER — TRAMADOL HYDROCHLORIDE 50 MG/1
TABLET ORAL
Qty: 10 TABLET | Refills: 0 | Status: SHIPPED | OUTPATIENT
Start: 2019-06-06 | End: 2019-06-19 | Stop reason: ALTCHOICE

## 2019-06-10 PROBLEM — K60.30 ANAL FISTULA: Status: ACTIVE | Noted: 2019-06-10

## 2019-06-10 PROBLEM — Z12.11 SCREENING FOR MALIGNANT NEOPLASM OF COLON: Status: ACTIVE | Noted: 2019-06-10

## 2019-06-10 PROBLEM — K60.2 ANAL FISSURE: Status: ACTIVE | Noted: 2019-06-10

## 2019-06-10 PROBLEM — K60.3 ANAL FISTULA: Status: ACTIVE | Noted: 2019-06-10

## 2019-06-19 ENCOUNTER — OFFICE VISIT (OUTPATIENT)
Dept: FAMILY MEDICINE CLINIC | Facility: CLINIC | Age: 49
End: 2019-06-19
Payer: COMMERCIAL

## 2019-06-19 VITALS
WEIGHT: 180 LBS | HEART RATE: 78 BPM | TEMPERATURE: 97.8 F | RESPIRATION RATE: 18 BRPM | BODY MASS INDEX: 33.13 KG/M2 | DIASTOLIC BLOOD PRESSURE: 74 MMHG | HEIGHT: 62 IN | SYSTOLIC BLOOD PRESSURE: 134 MMHG

## 2019-06-19 DIAGNOSIS — Z00.00 ANNUAL PHYSICAL EXAM: Primary | ICD-10-CM

## 2019-06-19 DIAGNOSIS — R92.2 DENSE BREAST: ICD-10-CM

## 2019-06-19 DIAGNOSIS — K21.9 GASTROESOPHAGEAL REFLUX DISEASE, ESOPHAGITIS PRESENCE NOT SPECIFIED: ICD-10-CM

## 2019-06-19 DIAGNOSIS — F41.9 ANXIETY: ICD-10-CM

## 2019-06-19 DIAGNOSIS — Z12.31 SCREENING MAMMOGRAM, ENCOUNTER FOR: ICD-10-CM

## 2019-06-19 DIAGNOSIS — R73.01 IMPAIRED FASTING GLUCOSE: ICD-10-CM

## 2019-06-19 PROBLEM — R92.30 DENSE BREAST: Status: ACTIVE | Noted: 2019-06-19

## 2019-06-19 PROCEDURE — 99396 PREV VISIT EST AGE 40-64: CPT | Performed by: FAMILY MEDICINE

## 2019-06-19 RX ORDER — ALPRAZOLAM 0.25 MG/1
TABLET ORAL
Qty: 4 TABLET | Refills: 0 | Status: SHIPPED | OUTPATIENT
Start: 2019-06-19 | End: 2019-08-15

## 2019-06-25 ENCOUNTER — TELEPHONE (OUTPATIENT)
Dept: FAMILY MEDICINE CLINIC | Facility: CLINIC | Age: 49
End: 2019-06-25

## 2019-06-25 DIAGNOSIS — Z12.31 SCREENING MAMMOGRAM, ENCOUNTER FOR: Primary | ICD-10-CM

## 2019-07-09 ENCOUNTER — OFFICE VISIT (OUTPATIENT)
Dept: PODIATRY | Facility: CLINIC | Age: 49
End: 2019-07-09
Payer: COMMERCIAL

## 2019-07-09 VITALS
RESPIRATION RATE: 17 BRPM | HEART RATE: 77 BPM | SYSTOLIC BLOOD PRESSURE: 131 MMHG | BODY MASS INDEX: 33.13 KG/M2 | DIASTOLIC BLOOD PRESSURE: 83 MMHG | HEIGHT: 62 IN | WEIGHT: 180 LBS

## 2019-07-09 DIAGNOSIS — S93.602A SPRAIN OF LEFT FOOT, INITIAL ENCOUNTER: ICD-10-CM

## 2019-07-09 DIAGNOSIS — M79.671 PAIN IN BOTH FEET: ICD-10-CM

## 2019-07-09 DIAGNOSIS — M72.2 PLANTAR FASCIITIS: Primary | ICD-10-CM

## 2019-07-09 DIAGNOSIS — M79.672 PAIN IN BOTH FEET: ICD-10-CM

## 2019-07-09 DIAGNOSIS — M21.962 ACQUIRED DEFORMITY OF LEFT FOOT: ICD-10-CM

## 2019-07-09 PROCEDURE — 20550 NJX 1 TENDON SHEATH/LIGAMENT: CPT | Performed by: PODIATRIST

## 2019-07-09 PROCEDURE — 99212 OFFICE O/P EST SF 10 MIN: CPT | Performed by: PODIATRIST

## 2019-07-09 NOTE — PROGRESS NOTES
Assessment/Plan:  Plantar fasciitis  Pain in both feet  Pes planus bilateral     Plan  Patient will use orthotics daily  She will take Tylenol p r n     Today, 1 25 cc of Kenalog 10 was injected into each heel without pain or complication  No problem-specific Assessment & Plan notes found for this encounter  Diagnoses and all orders for this visit:    Plantar fasciitis    Pain in both feet    Acquired deformity of left foot    Sprain of left foot, initial encounter          Subjective:  Patient is continuing ongoing pain of the heel bilateral   She suffers with post static dyskinesia      Past Medical History:   Diagnosis Date    Abnormal Pap smear of cervix     Asthma     Depression     Migraine        Past Surgical History:   Procedure Laterality Date     SECTION      SINUS SURGERY  2000    TONSILLECTOMY      in 20s       Allergies   Allergen Reactions    Aspirin Allergic Rhinitis    Citric Acid Anhydrous [Citric Acid] Allergic Rhinitis    Corn-Containing Products Allergic Rhinitis    Ibuprofen Allergic Rhinitis    Isoflavones Allergic Rhinitis    Naproxen Allergic Rhinitis    Citrus      Sneezing attack         Current Outpatient Medications:     ALPRAZolam (XANAX) 0 25 mg tablet, Take 1 an hour before foot procedure, may repeat in 1 hour ; do not drive when taking this medication, Disp: 4 tablet, Rfl: 0    EPINEPHrine (EPIPEN 2-JUANITO) 0 3 mg/0 3 mL SOAJ, Inject as directed, Disp: , Rfl:     famotidine (PEPCID) 40 MG tablet, Take by mouth, Disp: , Rfl:     Loratadine (CLARITIN) 10 MG CAPS, Take by mouth daily, Disp: , Rfl:     meclizine (ANTIVERT) 25 mg tablet, Take 1 tablet (25 mg total) by mouth 3 (three) times a day as needed for dizziness, Disp: 30 tablet, Rfl: 0    ondansetron (ZOFRAN) 4 mg tablet, Take 1 tablet (4 mg total) by mouth every 6 (six) hours (Patient not taking: Reported on 2019), Disp: 12 tablet, Rfl: 0    Triamcinolone Acetonide (NASACORT AQ NA), into each nostril, Disp: , Rfl:     Patient Active Problem List   Diagnosis    Allergic rhinitis    Apnea    Neck pain    Congenital pes planus of left foot    Congenital pes planus of right foot    Esophageal reflux    Fatigue    Hemorrhoids    Lichen sclerosus et atrophicus    Obesity    Plantar fasciitis    Pain in both feet    Acquired deformity of left foot    Left foot pain    Sprain of left foot    Nontraumatic tear of plantar fascia    Sprain of anterior talofibular ligament of left ankle    Anal fistula    Anal fissure    Screening for malignant neoplasm of colon    Dense breast    Impaired fasting glucose          Patient ID: Yesy Brown is a 51 y o  female  HPI    The following portions of the patient's history were reviewed and updated as appropriate:     family history includes Brain cancer in her father; Depression in her mother; Diabetes in her paternal grandfather and paternal grandmother; Hypertension in her mother  reports that she quit smoking about 17 years ago  She has never used smokeless tobacco  She reports that she drank alcohol  She reports that she does not use drugs  Vitals:    07/09/19 1306   BP: 131/83   Pulse: 77   Resp: 17       Review of Systems      Objective:  Patient's shoes and socks removed     Foot ExamPhysical Exam         General  General Appearance: appears stated age and healthy   Orientation: alert and oriented to person, place, and time   Affect: appropriate   Gait: antalgic         Right Foot/Ankle      Inspection and Palpation  Ecchymosis: none  Tenderness: none   Swelling: metatarsals   Arch: pes planus  Hammertoes: fifth toe  Hallux valgus: no  Hallux limitus: yes  Skin Exam: dry skin;      Neurovascular  Dorsalis pedis: 2+  Posterior tibial: 3+  Saphenous nerve sensation: normal  Tibial nerve sensation: normal  Superficial peroneal nerve sensation: normal  Deep peroneal nerve sensation: normal  Sural nerve sensation: normal  Achilles reflex: 2+  Babinski reflex: 2+        Left Foot/Ankle       Inspection and Palpation  Ecchymosis: none  Tenderness: bony tenderness, plantar fascia and calcaneus tenderness   Swelling: metatarsals and plantar fascia   Arch: pes planus  Hammertoes: fifth toe  Hallux valgus: no  Hallux limitus: yes  Skin Exam: dry skin;      Neurovascular  Dorsalis pedis: 2+  Posterior tibial: 3+  Saphenous nerve sensation: normal  Tibial nerve sensation: normal  Superficial peroneal nerve sensation: normal  Deep peroneal nerve sensation: normal  Sural nerve sensation: normal  Achilles reflex: 2+  Babinski reflex: 2+           Physical Exam   Constitutional: She is oriented to person, place, and time  She appears well-developed and well-nourished  Cardiovascular: Normal rate and regular rhythm  Pulses:       Dorsalis pedis pulses are 2+ on the right side, and 2+ on the left side  Posterior tibial pulses are 3+ on the right side, and 3+ on the left side  Musculoskeletal:        Left foot: There is bony tenderness  Patient is maximally pronated in stance and gait  There is pain with palpation left plantar fascia insertion  Negative Tinel sign  Negative pain with tuning fork test of left heel  X-ray demonstrates plantar calcaneal spurring bilateral    Feet:   Right Foot:   Skin Integrity: Positive for dry skin  Left Foot:   Skin Integrity: Positive for dry skin  Neurological: She is alert and oriented to person, place, and time  Reflex Scores:       Achilles reflexes are 2+ on the right side and 2+ on the left side  Skin: Skin is warm and dry  Capillary refill takes less than 2 seconds     Vitals reviewed               Procedures

## 2019-07-15 ENCOUNTER — HOSPITAL ENCOUNTER (OUTPATIENT)
Dept: RADIOLOGY | Facility: HOSPITAL | Age: 49
Discharge: HOME | End: 2019-07-15
Payer: COMMERCIAL

## 2019-07-15 VITALS — BODY MASS INDEX: 32.76 KG/M2 | HEIGHT: 62 IN | WEIGHT: 178 LBS

## 2019-07-15 DIAGNOSIS — R92.2 DENSE BREAST: ICD-10-CM

## 2019-07-15 DIAGNOSIS — Z12.31 SCREENING MAMMOGRAM, ENCOUNTER FOR: ICD-10-CM

## 2019-07-15 PROCEDURE — 77063 BREAST TOMOSYNTHESIS BI: CPT

## 2019-07-15 PROCEDURE — 77067 SCR MAMMO BI INCL CAD: CPT

## 2019-08-15 ENCOUNTER — ANNUAL EXAM (OUTPATIENT)
Dept: OBGYN CLINIC | Facility: CLINIC | Age: 49
End: 2019-08-15
Payer: COMMERCIAL

## 2019-08-15 VITALS — SYSTOLIC BLOOD PRESSURE: 110 MMHG | WEIGHT: 184 LBS | BODY MASS INDEX: 33.65 KG/M2 | DIASTOLIC BLOOD PRESSURE: 64 MMHG

## 2019-08-15 DIAGNOSIS — Z12.4 ENCOUNTER FOR SCREENING FOR MALIGNANT NEOPLASM OF CERVIX: ICD-10-CM

## 2019-08-15 DIAGNOSIS — Z01.419 WELL FEMALE EXAM WITH ROUTINE GYNECOLOGICAL EXAM: Primary | ICD-10-CM

## 2019-08-15 PROCEDURE — 99396 PREV VISIT EST AGE 40-64: CPT | Performed by: OBSTETRICS & GYNECOLOGY

## 2019-08-15 RX ORDER — AZELASTINE 1 MG/ML
1 SPRAY, METERED NASAL 2 TIMES DAILY
COMMUNITY
End: 2019-10-22 | Stop reason: ALTCHOICE

## 2019-08-15 NOTE — PROGRESS NOTES
ASSESSMENT & PLAN: Jean-Claude Gray is a 51 y o  Chidi Hatch with normal gynecologic exam     1   Routine well woman exam done today  2   Pap and HPV:Pap with HPV was done today  Current ASCCP Guidelines reviewed  3   Mammogram ordered  Recommend yearly mammography  4   The patient declined STD testing  No testing performed  5   Birth control : vasectomy  6  The following were reviewed in today's visit: breast self exam, mammography screening ordered, menopause, exercise and healthy diet  7   Patient to return to office in 12 months for annual exam      All questions have been answered to her satisfaction  CC:  Annual Gynecologic Examination    HPI: Jean-Claude Gray is a 51 y o  Chidi Hatch who presents for annual gynecologic examination  She has the following concerns:  Changed jobs, now sitting at desk all day  Health Maintenance:    She exercises 0 days per week  She wears her seatbelt routinely  She does not perform regular monthly self breast exams  She feels safe at home  Patients does not follow a balanced diet  Last mammogram: 2019    Past Medical History:   Diagnosis Date    Abnormal Pap smear of cervix     Asthma     Depression     Migraine        Past Surgical History:   Procedure Laterality Date     SECTION      SINUS SURGERY  2000    TONSILLECTOMY      in 25s       Past OB/Gyn History:  Period Duration (Days): 7  Period Pattern: (!) Irregular  Menstrual Flow: Heavy  Dysmenorrhea: (!) Mild  Dysmenorrhea Symptoms: CrampingPatient's last menstrual period was 2019  History of sexually transmitted infection: No  Patient is currently sexually active    Birth control: vasectomy  Last Pap 10/2016 :  no abnormalities; HPV negative    Family History:  Family History   Problem Relation Age of Onset    Hypertension Mother     Depression Mother    Garrett Paul Brain cancer Father     Diabetes Paternal Grandmother     Diabetes Paternal Grandfather     No Known Problems Maternal Aunt     No Known Problems Maternal Aunt     Breast cancer Neg Hx     Colon cancer Neg Hx        Social History:  Social History     Socioeconomic History    Marital status: /Civil Union     Spouse name: Not on file    Number of children: Not on file    Years of education: Not on file    Highest education level: Not on file   Occupational History    Not on file   Social Needs    Financial resource strain: Not on file    Food insecurity:     Worry: Not on file     Inability: Not on file    Transportation needs:     Medical: Not on file     Non-medical: Not on file   Tobacco Use    Smoking status: Former Smoker     Last attempt to quit: 2002     Years since quittin 1    Smokeless tobacco: Never Used   Substance and Sexual Activity    Alcohol use: Not Currently     Comment: social    Drug use: No    Sexual activity: Yes     Partners: Male     Birth control/protection: Male Sterilization   Lifestyle    Physical activity:     Days per week: Not on file     Minutes per session: Not on file    Stress: Not on file   Relationships    Social connections:     Talks on phone: Not on file     Gets together: Not on file     Attends [employer]ism service: Not on file     Active member of club or organization: Not on file     Attends meetings of clubs or organizations: Not on file     Relationship status: Not on file    Intimate partner violence:     Fear of current or ex partner: Not on file     Emotionally abused: Not on file     Physically abused: Not on file     Forced sexual activity: Not on file   Other Topics Concern    Not on file   Social History Narrative    Exercise habits: denied      Presently lives with , kids  Patient is   Patient is currently employed  Allergies:   Allergies   Allergen Reactions    Aspirin Allergic Rhinitis    Citric Acid Anhydrous [Citric Acid] Allergic Rhinitis    Corn-Containing Products Allergic Rhinitis    Ibuprofen Allergic Rhinitis    Isoflavones Allergic Rhinitis    Naproxen Allergic Rhinitis    Citrus      Sneezing attack       Medications:    Current Outpatient Medications:     azelastine (ASTELIN) 0 1 % nasal spray, 1 spray into each nostril 2 (two) times a day Use in each nostril as directed, Disp: , Rfl:     EPINEPHrine (EPIPEN 2-JUANITO) 0 3 mg/0 3 mL SOAJ, Inject as directed, Disp: , Rfl:     famotidine (PEPCID) 40 MG tablet, Take by mouth, Disp: , Rfl:     Loratadine (CLARITIN) 10 MG CAPS, Take by mouth daily, Disp: , Rfl:     meclizine (ANTIVERT) 25 mg tablet, Take 1 tablet (25 mg total) by mouth 3 (three) times a day as needed for dizziness, Disp: 30 tablet, Rfl: 0    Review of Systems:  Review of Systems   Constitutional: Negative for unexpected weight change  Respiratory: Negative for shortness of breath  Cardiovascular: Negative for chest pain  Gastrointestinal: Negative for abdominal distention, abdominal pain, blood in stool, constipation, nausea and vomiting  Genitourinary: Positive for menstrual problem (skips months, then heavy cycle after)  Negative for difficulty urinating, dysuria, frequency, urgency, vaginal bleeding and vaginal discharge  Neurological: Negative for headaches  Physical Exam:  /64   Wt 83 5 kg (184 lb)   LMP 08/01/2019   BMI 33 65 kg/m²      Physical Exam   Constitutional: She is oriented to person, place, and time  Vital signs are normal  She appears well-developed and well-nourished  Genitourinary: Vagina normal and uterus normal  Pelvic exam was performed with patient supine  There is no rash, tenderness or lesion on the right labia  There is no rash, tenderness or lesion on the left labia  Vagina exhibits rugosity  No erythema, tenderness or bleeding in the vagina  No signs of injury around the vagina  No vaginal discharge found  Right adnexum does not display mass, does not display tenderness and does not display fullness   Left adnexum does not display mass, does not display tenderness and does not display fullness  Cervix does not exhibit motion tenderness, lesion, discharge or polyp  Uterus is mobile  Uterus is not enlarged, tender or irregular (is regular)  Genitourinary Comments: No bladder tenderness   HENT:   Head: Normocephalic  Neck: No thyromegaly present  Cardiovascular: Normal rate, regular rhythm and normal heart sounds  Pulmonary/Chest: Effort normal and breath sounds normal  No respiratory distress  Right breast exhibits no inverted nipple, no mass, no nipple discharge, no skin change and no tenderness  Left breast exhibits no inverted nipple, no nipple discharge, no skin change and no tenderness  Abdominal: Soft  She exhibits no distension  There is no tenderness  Neurological: She is alert and oriented to person, place, and time  Psychiatric: She has a normal mood and affect  Her behavior is normal    Vitals reviewed

## 2019-08-17 LAB
CYTOLOGIST CVX/VAG CYTO: NORMAL
DX ICD CODE: NORMAL
HPV I/H RISK 1 DNA CVX QL PROBE+SIG AMP: NEGATIVE
OTHER STN SPEC: NORMAL
PATH REPORT.FINAL DX SPEC: NORMAL
SL AMB NOTE:: NORMAL
SL AMB SPECIMEN ADEQUACY: NORMAL
SL AMB TEST METHODOLOGY: NORMAL

## 2019-09-16 ENCOUNTER — TELEPHONE (OUTPATIENT)
Dept: FAMILY MEDICINE CLINIC | Facility: CLINIC | Age: 49
End: 2019-09-16

## 2019-10-22 ENCOUNTER — OFFICE VISIT (OUTPATIENT)
Dept: OTOLARYNGOLOGY | Facility: CLINIC | Age: 49
End: 2019-10-22
Payer: COMMERCIAL

## 2019-10-22 VITALS
DIASTOLIC BLOOD PRESSURE: 74 MMHG | HEIGHT: 62 IN | SYSTOLIC BLOOD PRESSURE: 126 MMHG | WEIGHT: 184 LBS | TEMPERATURE: 98.4 F | HEART RATE: 87 BPM | BODY MASS INDEX: 33.86 KG/M2

## 2019-10-22 DIAGNOSIS — J32.8 OTHER CHRONIC SINUSITIS: Primary | ICD-10-CM

## 2019-10-22 DIAGNOSIS — J33.9 SAMTER'S TRIAD: ICD-10-CM

## 2019-10-22 DIAGNOSIS — Z88.6 SAMTER'S TRIAD: ICD-10-CM

## 2019-10-22 DIAGNOSIS — J33.9 NASAL POLYPS: ICD-10-CM

## 2019-10-22 DIAGNOSIS — J45.909 SAMTER'S TRIAD: ICD-10-CM

## 2019-10-22 DIAGNOSIS — R43.8 HYPOSMIA: ICD-10-CM

## 2019-10-22 PROBLEM — J32.9 CHRONIC SINUSITIS: Status: ACTIVE | Noted: 2019-10-22

## 2019-10-22 PROCEDURE — 99204 OFFICE O/P NEW MOD 45 MIN: CPT | Performed by: SPECIALIST

## 2019-10-22 RX ORDER — GABAPENTIN 100 MG/1
100 CAPSULE ORAL 3 TIMES DAILY
COMMUNITY
End: 2020-10-19

## 2019-10-22 RX ORDER — TRAMADOL HYDROCHLORIDE 50 MG/1
50 TABLET ORAL EVERY 6 HOURS PRN
COMMUNITY
End: 2020-10-19

## 2019-10-22 RX ORDER — ALPRAZOLAM 0.25 MG/1
TABLET ORAL
COMMUNITY
End: 2020-10-19

## 2019-10-22 NOTE — PROGRESS NOTES
Assessment/Plan:    Nasal polyps  Grade III nasal polyps visible with nasal speculum  Chronic sinusitis  Grade III nasal polyps noted on nasal speculum exam   Reviewed Ct scan head from 02/2019 indicating pansinusitis and nasal polyposis and prior limited surgical interventions  Recommend using saline irrigation and nasal steroids on daily basis for up to at least three months to see improvement  Reviewed oral steroids taper  Reviewed possible allergy involvement and follow up with allergist   Reviewed Delora Ghislaine, mometasone rinses, and Dupixent  Reviewed clinical trial options  Discussed surgical options if needed  Recommend revision FESS with image guidance  Reviewed risk of reoccurrence of nasal polyps due to AERD  Recommend repeat Ct scan sinus with image guidance prior to proceed with surgery  Treat with oral steroids prior to CT scan  Reviewed her concerns with time out of work due to surgery, approx one week out  Pt may defer surgery until summer months  Agreed consider clinical trial options in meantime  Agreed to consider options  Follow up in few weeks to discuss further        Samter's triad  Discussed AERD and samter's triad impact on asthma, allergies, and nasal polyposis         Diagnoses and all orders for this visit:    Other chronic sinusitis    Nasal polyps    Hyposmia    Samter's triad    Other orders  -     traMADol (ULTRAM) 50 mg tablet; Take 50 mg by mouth every 6 (six) hours as needed for moderate pain  -     gabapentin (NEURONTIN) 100 mg capsule; Take 100 mg by mouth 3 (three) times a day  -     ALPRAZolam (XANAX) 0 25 mg tablet; Take by mouth daily at bedtime as needed for anxiety          Subjective:      Patient ID: Noemi Stubbs is a 51 y o  female  Presents today as a new patient due to recurrent sinusitis  Concussion last February and informed of sinusitis  Since then sought care with another ENT and urgent care  Nasal congestion, post nasal drip   Constant sinus pain and pressure  History allergies, no current allergist   Bilateral hearing diminished due to sinus congestion  Snoring nightly  No sense of smell  Bilateral ear pain with occasional vertigo  Finished an antibiotic within past week  History of prior surgery to remove nasal polyps  Mild asthma  Unable to tolerate aspirin or ibuprofen          The following portions of the patient's history were reviewed and updated as appropriate: allergies, current medications, past family history, past medical history, past social history, past surgical history and problem list     Review of Systems   Constitutional: Negative  HENT: Positive for congestion, hearing loss, postnasal drip, sinus pressure and sinus pain  Negative for ear discharge, ear pain, nosebleeds, rhinorrhea, sore throat, tinnitus and voice change  Eyes: Negative  Respiratory: Negative for chest tightness and shortness of breath  Cardiovascular: Negative  Gastrointestinal: Negative  Endocrine: Negative  Musculoskeletal: Negative  Skin: Negative for color change  Neurological: Negative for dizziness, numbness and headaches  Psychiatric/Behavioral: Negative  Objective:      /74 (BP Location: Left arm, Patient Position: Sitting, Cuff Size: Adult)   Pulse 87   Temp 98 4 °F (36 9 °C) (Temporal)   Ht 5' 2" (1 575 m)   Wt 83 5 kg (184 lb)   BMI 33 65 kg/m²          Physical Exam   Constitutional: She is oriented to person, place, and time  She appears well-developed and well-nourished  She is cooperative  HENT:   Head: Normocephalic  Right Ear: Hearing, tympanic membrane, external ear and ear canal normal  No drainage or tenderness  Tympanic membrane is not perforated and not erythematous  No decreased hearing is noted  Left Ear: Hearing, tympanic membrane, external ear and ear canal normal  No drainage or tenderness  Tympanic membrane is not perforated and not erythematous   No decreased hearing is noted    Nose: Nose normal  No sinus tenderness, nasal deformity or septal deviation  Mouth/Throat: Uvula is midline, oropharynx is clear and moist and mucous membranes are normal  Mucous membranes are not pale and not dry  No oral lesions  Normal dentition  No oropharyngeal exudate  Grad III nasal polyps on exam with nasal speculum     Neck: Normal range of motion and full passive range of motion without pain  Neck supple  No tracheal deviation present  Cardiovascular: Normal rate  Pulmonary/Chest: Effort normal  No accessory muscle usage  No respiratory distress  Musculoskeletal:        Right shoulder: She exhibits normal range of motion  Lymphadenopathy:     She has no cervical adenopathy  Neurological: She is alert and oriented to person, place, and time  No cranial nerve deficit or sensory deficit  Skin: Skin is warm, dry and intact  Psychiatric: She has a normal mood and affect         Scribe Attestation    I,:   JOAQUIN Nj am acting as a scribe while in the presence of the attending physician :        I,:   Agustin Celestin MD personally performed the services described in this documentation    as scribed in my presence :

## 2019-10-24 ENCOUNTER — TELEPHONE (OUTPATIENT)
Dept: OTOLARYNGOLOGY | Facility: CLINIC | Age: 49
End: 2019-10-24

## 2019-10-24 NOTE — TELEPHONE ENCOUNTER
Clarification  Pt to start Prednisone 12 day taper  4 tab for 3 days then 3 tab for 3 days then 2 tab for 3 days then one tab for 3 days  Then obtain Ct scan sinuses  Schedule surgery    Note pt deductible is met for the year, until Dec 1st

## 2019-10-24 NOTE — TELEPHONE ENCOUNTER
Patient would like to schedule surgery & to have Tonia contact the patient to set-up  Patient is also asking if Dr Luc Dow can prescribe the steroids that was spoken about on Tuesday's visit  Patient stated that Dr Luc Dow wanted to get "additional pictures"  Patient would like to know how to move forward with that as well     [phone]

## 2019-11-07 ENCOUNTER — HOSPITAL ENCOUNTER (OUTPATIENT)
Dept: RADIOLOGY | Facility: HOSPITAL | Age: 49
Discharge: HOME | End: 2019-11-07
Payer: COMMERCIAL

## 2019-11-07 DIAGNOSIS — J33.9 NASAL POLYPS: ICD-10-CM

## 2019-11-07 PROCEDURE — 70486 CT MAXILLOFACIAL W/O DYE: CPT

## 2019-11-19 ENCOUNTER — OFFICE VISIT (OUTPATIENT)
Dept: OTOLARYNGOLOGY | Facility: CLINIC | Age: 49
End: 2019-11-19
Payer: COMMERCIAL

## 2019-11-19 VITALS — HEIGHT: 62 IN | BODY MASS INDEX: 33.13 KG/M2 | WEIGHT: 180 LBS

## 2019-11-19 DIAGNOSIS — J45.909 SAMTER'S TRIAD: ICD-10-CM

## 2019-11-19 DIAGNOSIS — J32.8 OTHER CHRONIC SINUSITIS: ICD-10-CM

## 2019-11-19 DIAGNOSIS — J33.9 NASAL POLYPS: Primary | ICD-10-CM

## 2019-11-19 DIAGNOSIS — Z88.6 SAMTER'S TRIAD: ICD-10-CM

## 2019-11-19 DIAGNOSIS — J33.9 SAMTER'S TRIAD: ICD-10-CM

## 2019-11-19 DIAGNOSIS — R43.8 HYPOSMIA: ICD-10-CM

## 2019-11-19 PROCEDURE — 31231 NASAL ENDOSCOPY DX: CPT | Performed by: SPECIALIST

## 2019-11-19 PROCEDURE — 99024 POSTOP FOLLOW-UP VISIT: CPT | Performed by: SPECIALIST

## 2019-11-19 NOTE — PROGRESS NOTES
Assessment/Plan:    Nasal polyps  POV due to bilateral FESS two weeks ago  Nasal endoscopy today indicating bilateral arch stents in place, crusting bilaterally  Reviewed post procedure expectations including pain and inflammation  Well healing at this time  Pt tolerated procedure well  Used saline rinse in office today to ensure proper use of rinses  Oral steroids once a day for one week then taper to every other day  Encouraged to continue saline rinses one to two times daily  May gently blow nose  Resume nasal steroids daily, may trial X Ainsley  Follow up in 2 weeks         Diagnoses and all orders for this visit:    Nasal polyps  -     Nasal / sinus endoscopy    Other chronic sinusitis  -     Nasal / sinus endoscopy    Samter's triad    Hyposmia          Subjective:      Patient ID: Josué Gray is a 51 y o  female  Presents today for POV due to bilateral FESS  Overall improved since surgery  Nasal congestion and post nasal drip persist   Unable use saline rinses due to runny nose, sneezing  The following portions of the patient's history were reviewed and updated as appropriate: allergies, current medications, past family history, past medical history, past social history, past surgical history and problem list     Review of Systems   Constitutional: Negative  HENT: Positive for congestion, sinus pressure and sinus pain  Negative for ear discharge, ear pain, hearing loss, nosebleeds, postnasal drip, rhinorrhea, sore throat, tinnitus and voice change  Eyes: Negative  Respiratory: Negative for chest tightness and shortness of breath  Cardiovascular: Negative  Gastrointestinal: Negative  Endocrine: Negative  Musculoskeletal: Negative  Skin: Negative for color change  Neurological: Negative for dizziness, numbness and headaches  Psychiatric/Behavioral: Negative            Objective:      Ht 5' 2" (1 575 m)   Wt 81 6 kg (180 lb)   BMI 32 92 kg/m² Physical Exam   Constitutional: She is oriented to person, place, and time  She appears well-developed and well-nourished  She is cooperative  HENT:   Head: Normocephalic  Right Ear: Hearing, tympanic membrane, external ear and ear canal normal  No drainage or tenderness  Tympanic membrane is not perforated and not erythematous  No decreased hearing is noted  Left Ear: Hearing, tympanic membrane, external ear and ear canal normal  No drainage or tenderness  Tympanic membrane is not perforated and not erythematous  No decreased hearing is noted  Nose: Nose normal  No sinus tenderness, nasal deformity or septal deviation  Mouth/Throat: Uvula is midline, oropharynx is clear and moist and mucous membranes are normal  Mucous membranes are not pale and not dry  No oral lesions  Normal dentition  No oropharyngeal exudate  Neck: Normal range of motion and full passive range of motion without pain  Neck supple  No tracheal deviation present  Cardiovascular: Normal rate  Pulmonary/Chest: Effort normal  No accessory muscle usage  No respiratory distress  Musculoskeletal:        Right shoulder: She exhibits normal range of motion  Lymphadenopathy:     She has no cervical adenopathy  Neurological: She is alert and oriented to person, place, and time  No cranial nerve deficit or sensory deficit  Skin: Skin is warm, dry and intact  Psychiatric: She has a normal mood and affect           Scribe Attestation    I,:   JOAQUIN Douglas am acting as a scribe while in the presence of the attending physician :        I,:   Brandi Chino MD personally performed the services described in this documentation    as scribed in my presence :                Nasal / sinus endoscopy     Date/Time 11/19/2019 2:45 PM     Performed by  Brandi Chino MD     Authorized by Brandi Chino MD      Procedure Details   Procedure Notes: Arch sinus stents in place  Bilateral crusting and debris    Patient tolerance: Patient tolerated the procedure well with no immediate complications

## 2019-11-19 NOTE — ASSESSMENT & PLAN NOTE
POV due to bilateral FESS two weeks ago  Nasal endoscopy today indicating bilateral arch stents in place, crusting bilaterally  Reviewed post procedure expectations including pain and inflammation  Well healing at this time  Pt tolerated procedure well  Used saline rinse in office today to ensure proper use of rinses  Oral steroids once a day for one week then taper to every other day  Encouraged to continue saline rinses one to two times daily  May gently blow nose  Resume nasal steroids daily, may trial X Ainsley      Follow up in 2 weeks

## 2020-01-21 ENCOUNTER — OFFICE VISIT (OUTPATIENT)
Dept: OTOLARYNGOLOGY | Facility: CLINIC | Age: 50
End: 2020-01-21
Payer: COMMERCIAL

## 2020-01-21 VITALS
SYSTOLIC BLOOD PRESSURE: 114 MMHG | WEIGHT: 180 LBS | HEIGHT: 62 IN | DIASTOLIC BLOOD PRESSURE: 74 MMHG | BODY MASS INDEX: 33.13 KG/M2

## 2020-01-21 DIAGNOSIS — Z88.6 SAMTER'S TRIAD: ICD-10-CM

## 2020-01-21 DIAGNOSIS — J32.8 OTHER CHRONIC SINUSITIS: ICD-10-CM

## 2020-01-21 DIAGNOSIS — R43.8 HYPOSMIA: ICD-10-CM

## 2020-01-21 DIAGNOSIS — J33.9 NASAL POLYPS: Primary | ICD-10-CM

## 2020-01-21 DIAGNOSIS — J45.909 SAMTER'S TRIAD: ICD-10-CM

## 2020-01-21 DIAGNOSIS — R09.82 POST-NASAL DRIP: ICD-10-CM

## 2020-01-21 DIAGNOSIS — J33.9 SAMTER'S TRIAD: ICD-10-CM

## 2020-01-21 PROCEDURE — 99213 OFFICE O/P EST LOW 20 MIN: CPT | Performed by: SPECIALIST

## 2020-01-21 PROCEDURE — 31231 NASAL ENDOSCOPY DX: CPT | Performed by: SPECIALIST

## 2020-01-21 NOTE — PROGRESS NOTES
Assessment/Plan:    Hyposmia  Discussed improvement in sense of smell      Nasal polyps  Follow up due to bilateral FESS two months ago  Nasal endoscopy today indicating well healing, widely patent except left frontal polypoid change  Reviewed post procedure expectations including pain and inflammation  Well healing at this time  Pt tolerated procedure well  Discussed post FESS expectations  Encouraged to continue saline rinses one to two times daily  May gently blow nose  Continue nasal steroids daily, X Ainsley  Follow up in 8 weeks  Visit in one week for video tape purposes due to sinuses      Post-nasal drip  Discussed persistent post nasal drip associated with recent surgery  Reviewed further interventions including nasal sprays, Dupixent, vs Clarifix  Diagnoses and all orders for this visit:    Nasal polyps  -     Nasal / sinus endoscopy    Other chronic sinusitis  -     Nasal / sinus endoscopy    Hyposmia    Samter's triad    Post-nasal drip          Subjective:      Patient ID: Amy Bryan is a 51 y o  female  Presents today for follow up due to bilateral FESS  Overall improved since surgery  Nasal congestion and post nasal drip slowly improving  Sense of smell has returned            The following portions of the patient's history were reviewed and updated as appropriate: allergies, current medications, past family history, past medical history, past social history, past surgical history and problem list     Review of Systems   Constitutional: Negative  HENT: Positive for congestion and postnasal drip  Negative for ear discharge, ear pain, hearing loss, nosebleeds, rhinorrhea, sinus pressure, sinus pain, sore throat, tinnitus and voice change  Eyes: Negative  Respiratory: Negative for chest tightness and shortness of breath  Cardiovascular: Negative  Gastrointestinal: Negative  Endocrine: Negative  Musculoskeletal: Negative      Skin: Negative for color change  Neurological: Negative for dizziness, numbness and headaches  Psychiatric/Behavioral: Negative  Objective:      /74 (BP Location: Left arm, Patient Position: Sitting, Cuff Size: Adult)   Ht 5' 2" (1 575 m)   Wt 81 6 kg (180 lb)   BMI 32 92 kg/m²          Physical Exam   Constitutional: She is oriented to person, place, and time  She appears well-developed and well-nourished  She is cooperative  HENT:   Head: Normocephalic  Right Ear: Hearing, tympanic membrane, external ear and ear canal normal  No drainage or tenderness  Tympanic membrane is not perforated and not erythematous  No decreased hearing is noted  Left Ear: Hearing, tympanic membrane, external ear and ear canal normal  No drainage or tenderness  Tympanic membrane is not perforated and not erythematous  No decreased hearing is noted  Nose: Mucosal edema and rhinorrhea present  No sinus tenderness, nasal deformity or septal deviation  Mouth/Throat: Uvula is midline, oropharynx is clear and moist and mucous membranes are normal  Mucous membranes are not pale and not dry  No oral lesions  Normal dentition  No oropharyngeal exudate  Neck: Normal range of motion and full passive range of motion without pain  Neck supple  No tracheal deviation present  Cardiovascular: Normal rate  Pulmonary/Chest: Effort normal  No accessory muscle usage  No respiratory distress  Musculoskeletal:        Right shoulder: She exhibits normal range of motion  Lymphadenopathy:     She has no cervical adenopathy  Neurological: She is alert and oriented to person, place, and time  No cranial nerve deficit or sensory deficit  Skin: Skin is warm, dry and intact  Psychiatric: She has a normal mood and affect           Scribe Attestation    I,:   Princess Holt am acting as a scribe while in the presence of the attending physician :        I,:   Lele Moore MD personally performed the services described in this documentation    as scribed in my presence :                Nasal / sinus endoscopy     Date/Time 1/21/2020 2:45 PM     Performed by  Nedra Tomlinson MD     Authorized by Nedra Tomlinson MD      Universal Protocol Consent: Verbal consent obtained    Patient understanding: patient states understanding of the procedure being performed  Patient consent: the patient's understanding of the procedure matches consent given        Local anesthesia used: yes     Anesthesia   Local anesthesia used: yes  Local Anesthetic: topical anesthetic and lidocaine spray     Sedation   Patient sedated: no       Procedure Details   Procedure Notes: Widely patent, well healing  Left frontal with polypoid change    Patient tolerance: Patient tolerated the procedure well with no immediate complications

## 2020-01-21 NOTE — ASSESSMENT & PLAN NOTE
Discussed persistent post nasal drip associated with recent surgery  Reviewed further interventions including nasal sprays, Dupixent, vs Clarifix

## 2020-01-21 NOTE — ASSESSMENT & PLAN NOTE
Follow up due to bilateral FESS two months ago  Nasal endoscopy today indicating well healing, widely patent except left frontal polypoid change  Reviewed post procedure expectations including pain and inflammation  Well healing at this time  Pt tolerated procedure well  Discussed post FESS expectations  Encouraged to continue saline rinses one to two times daily  May gently blow nose  Continue nasal steroids daily, MIKE Schmitz      Follow up in 8 weeks  Visit in one week for video tape purposes due to sinuses

## 2020-02-04 ENCOUNTER — EVALUATION (OUTPATIENT)
Dept: PHYSICAL THERAPY | Facility: CLINIC | Age: 50
End: 2020-02-04
Payer: COMMERCIAL

## 2020-02-04 DIAGNOSIS — R42 DIZZINESS: Primary | ICD-10-CM

## 2020-02-04 PROCEDURE — 97161 PT EVAL LOW COMPLEX 20 MIN: CPT | Performed by: PHYSICAL THERAPIST

## 2020-02-04 NOTE — PROGRESS NOTES
PT Evaluation     Today's date: 2020  Patient name: Noemi Stubbs  : 1970  MRN: [id number]  Referring provider: Barbara Lerma PA-C  Dx:   Encounter Diagnosis     ICD-10-CM    1  Dizziness R42                   Assessment  Assessment details: Patient is a 51year old female who presents to skilled PT for spinning dizziness  ENT report notes + left [employer]  Assessment this date notes negative findings for positional testing  Patient did perform self eply at home with instructions given by ENT  L Eply was perform in clinic with negative symptoms  Patient education was provided that patient most likely treated self with home Eply and corrected the issue  1 appointment was made in case if symptoms come back for this 2020  Patient to call clinic to cancel if felling fine day before appointment to cancel  Patient is agreeable to PT recommendation  Other impairment: dizziness     Symptom irritability: low  Goals  To be assessed if skilled PT is needed     Plan  Plan details: Discharge if patient calls to cancel appointment this week  Planned therapy interventions: neuromuscular re-education  Frequency: 2x week  Plan of Care beginning date: 2020  Plan of Care expiration date: 3/3/2020        Subjective Evaluation    History of Present Illness  Mechanism of injury: Per MD report from ENT  Started 3 days ago with vertigo with head movements after dying her hair and flipping her head upside down to wash it  She has had vertigo before but never lasted this long  She describes symptoms as room spinning vertigo associated with all head movements and lasts until she moves her head into another position     Pain  No pain reported    Social Support  Steps to enter house: yes  Stairs in house: yes   Lives in: multiple-level home  Lives with: spouse    Hand dominance: right    Patient Goals  Patient goals for therapy: increased strength, improved balance and independence with ADLs/IADLs          Objective     Functional Assessment        Comments  Positional test:  Guidocalvin Mcqueen   R: negative  L: negative     Roll test:   R: negative   L: negative       Flowsheet Rows      Most Recent Value   PT/OT G-Codes   Current Score  56   Projected Score  84             Precautions:  has a past medical history of Abnormal Pap smear of cervix, Allergic rhinitis (2008), Asthma, Depression, Dizziness (2016), Fatigue (2015), GERD (gastroesophageal reflux disease) (2009), Migraine, Nasal congestion (2015), and Obesity (2017)

## 2020-02-07 ENCOUNTER — APPOINTMENT (OUTPATIENT)
Dept: PHYSICAL THERAPY | Facility: CLINIC | Age: 50
End: 2020-02-07
Payer: COMMERCIAL

## 2020-04-23 NOTE — PROGRESS NOTES
Self Discharge    Patient did not return to therapy, patient called and did not schedule, will be discharged per hospital policy

## 2020-06-26 ENCOUNTER — TELEPHONE (OUTPATIENT)
Dept: OTOLARYNGOLOGY | Facility: CLINIC | Age: 50
End: 2020-06-26

## 2020-06-29 ENCOUNTER — OFFICE VISIT (OUTPATIENT)
Dept: OTOLARYNGOLOGY | Facility: CLINIC | Age: 50
End: 2020-06-29
Payer: COMMERCIAL

## 2020-06-29 VITALS — HEIGHT: 62 IN | BODY MASS INDEX: 33.13 KG/M2 | TEMPERATURE: 98.7 F | WEIGHT: 180 LBS

## 2020-06-29 DIAGNOSIS — J32.8 OTHER CHRONIC SINUSITIS: ICD-10-CM

## 2020-06-29 DIAGNOSIS — J33.9 SAMTER'S TRIAD: ICD-10-CM

## 2020-06-29 DIAGNOSIS — J45.909 SAMTER'S TRIAD: ICD-10-CM

## 2020-06-29 DIAGNOSIS — R09.82 POST-NASAL DRIP: ICD-10-CM

## 2020-06-29 DIAGNOSIS — J33.9 NASAL POLYPS: Primary | ICD-10-CM

## 2020-06-29 DIAGNOSIS — R43.8 HYPOSMIA: ICD-10-CM

## 2020-06-29 DIAGNOSIS — Z88.6 SAMTER'S TRIAD: ICD-10-CM

## 2020-06-29 PROCEDURE — 31231 NASAL ENDOSCOPY DX: CPT | Performed by: SPECIALIST

## 2020-06-29 PROCEDURE — 3008F BODY MASS INDEX DOCD: CPT | Performed by: SPECIALIST

## 2020-06-29 PROCEDURE — 99213 OFFICE O/P EST LOW 20 MIN: CPT | Performed by: SPECIALIST

## 2020-06-29 PROCEDURE — 1036F TOBACCO NON-USER: CPT | Performed by: SPECIALIST

## 2020-10-19 ENCOUNTER — ANNUAL EXAM (OUTPATIENT)
Dept: OBGYN CLINIC | Facility: CLINIC | Age: 50
End: 2020-10-19
Payer: COMMERCIAL

## 2020-10-19 VITALS
HEIGHT: 62 IN | DIASTOLIC BLOOD PRESSURE: 70 MMHG | WEIGHT: 193 LBS | SYSTOLIC BLOOD PRESSURE: 126 MMHG | TEMPERATURE: 97.9 F | BODY MASS INDEX: 35.51 KG/M2

## 2020-10-19 DIAGNOSIS — R92.2 DENSE BREASTS: ICD-10-CM

## 2020-10-19 DIAGNOSIS — Z01.419 WOMEN'S ANNUAL ROUTINE GYNECOLOGICAL EXAMINATION: Primary | ICD-10-CM

## 2020-10-19 DIAGNOSIS — Z12.31 ENCOUNTER FOR SCREENING MAMMOGRAM FOR MALIGNANT NEOPLASM OF BREAST: ICD-10-CM

## 2020-10-19 PROCEDURE — 99396 PREV VISIT EST AGE 40-64: CPT | Performed by: OBSTETRICS & GYNECOLOGY

## 2021-04-14 ENCOUNTER — TRANSCRIBE ORDERS (OUTPATIENT)
Dept: ADMINISTRATIVE | Facility: HOSPITAL | Age: 51
End: 2021-04-14

## 2021-04-20 ENCOUNTER — HOSPITAL ENCOUNTER (OUTPATIENT)
Dept: RADIOLOGY | Facility: HOSPITAL | Age: 51
Discharge: HOME | End: 2021-04-20
Attending: OBSTETRICS & GYNECOLOGY
Payer: COMMERCIAL

## 2021-04-20 VITALS — BODY MASS INDEX: 34.41 KG/M2 | HEIGHT: 62 IN | WEIGHT: 187 LBS

## 2021-04-20 DIAGNOSIS — Z12.31 ENCOUNTER FOR SCREENING MAMMOGRAM FOR MALIGNANT NEOPLASM OF BREAST: ICD-10-CM

## 2021-04-20 DIAGNOSIS — Z12.31 SCREENING MAMMOGRAM, ENCOUNTER FOR: ICD-10-CM

## 2021-04-20 PROCEDURE — 77067 SCR MAMMO BI INCL CAD: CPT

## 2021-04-20 PROCEDURE — 77063 BREAST TOMOSYNTHESIS BI: CPT

## 2021-04-21 ENCOUNTER — TELEPHONE (OUTPATIENT)
Dept: RADIOLOGY | Facility: HOSPITAL | Age: 51
End: 2021-04-21

## 2021-04-22 ENCOUNTER — HOSPITAL ENCOUNTER (OUTPATIENT)
Dept: RADIOLOGY | Facility: HOSPITAL | Age: 51
Discharge: HOME | End: 2021-04-22

## 2021-04-22 DIAGNOSIS — Z12.31 BREAST CANCER SCREENING BY MAMMOGRAM: ICD-10-CM

## 2021-08-04 ENCOUNTER — OFFICE VISIT (OUTPATIENT)
Dept: GASTROENTEROLOGY | Facility: CLINIC | Age: 51
End: 2021-08-04
Payer: COMMERCIAL

## 2021-08-04 VITALS
DIASTOLIC BLOOD PRESSURE: 94 MMHG | BODY MASS INDEX: 36.25 KG/M2 | SYSTOLIC BLOOD PRESSURE: 133 MMHG | HEIGHT: 62 IN | WEIGHT: 197 LBS | HEART RATE: 82 BPM

## 2021-08-04 DIAGNOSIS — K21.9 GASTROESOPHAGEAL REFLUX DISEASE, UNSPECIFIED WHETHER ESOPHAGITIS PRESENT: Primary | ICD-10-CM

## 2021-08-04 DIAGNOSIS — Z12.11 SCREENING FOR MALIGNANT NEOPLASM OF COLON: ICD-10-CM

## 2021-08-04 DIAGNOSIS — K59.00 CONSTIPATION, UNSPECIFIED CONSTIPATION TYPE: ICD-10-CM

## 2021-08-04 PROCEDURE — 99203 OFFICE O/P NEW LOW 30 MIN: CPT | Performed by: NURSE PRACTITIONER

## 2021-08-04 RX ORDER — PANTOPRAZOLE SODIUM 20 MG/1
20 TABLET, DELAYED RELEASE ORAL DAILY
Qty: 30 TABLET | Refills: 2 | Status: SHIPPED | OUTPATIENT
Start: 2021-08-04 | End: 2021-12-30 | Stop reason: SDUPTHER

## 2021-08-04 RX ORDER — IPRATROPIUM BROMIDE 21 UG/1
SPRAY, METERED NASAL
COMMUNITY

## 2021-08-04 NOTE — PATIENT INSTRUCTIONS
-Continue fiber supplementation, you can try Miralax 1/2 to 1 scoop daily as well if fiber is ineffective but I would continue the fiber supplementation as well  -Start Protonix 20mg daily 1/2 hour before breakfast on an empty stomach  -We will schedule EGD/Colonoscopy at [facility]

## 2021-08-04 NOTE — PROGRESS NOTES
[facility] - Outpatient Consultation  Dick Frost 49 y o  female MRN: [id number]  Encounter: [id number]          ASSESSMENT AND PLAN:      1  Gastroesophageal reflux disease, unspecified whether esophagitis present  Patient reports uncontrolled GERD with symptoms on a daily basis despite daily Pepcid, denies any dysphagia, odynophagia, weight loss  She is having to take Tums for breakthrough symptoms  She has a remote history of use esophagitis on 2007 EGD, but 2012 EGD failed to show this  Discussed lifestyle modifications including weight loss and eating small meals, she is already avoiding trigger foods and being careful not to eat late at night  Denies tobacco or alcohol use  Will start low-dose PPI pantoprazole 20 mg daily 1/2 hour before breakfast (unable to order Prilosec due to corn allergy) and schedule EGD for further evaluation with Dr Yi at [facility] (not able to go to [facility] due to her insurance)  Prep and procedure explained  She is not on any blood thinners  She reports nausea/vomiting after anesthesia, but no other issues  -     pantoprazole (PROTONIX) 20 mg tablet; Take 1 tablet (20 mg total) by mouth daily  -     EGD; Future; Expected date: 08/04/2021    2  Constipation, unspecified constipation type  3  Screening for malignant neoplasm of colon  Constipation likely secondary to diet, improving with fiber pill supplementation  Last TSH checked in 2019 normal  Last colonoscopy in 2007 normal  Denies family history of colon cancer, weight loss, melena, hematochezia  -Discussed with pt to continue fiber supplementation, can add Miralax 1/2 -1 scoop daily as well if fiber alone is ineffective  -Will also schedule colonoscopy for further evaluation, last colonoscopy >10 years ago  -     Colonoscopy;  Future; Expected date: 08/04/2021        ______________________________________________________________________    HPI:  Dick Frost is a 49 y o  female with past medical history of migraines, depression, asthma, obesity, eosinophilic esophagitis in 3272, and GERD who presents to discuss reflux symptoms  She has been taking Pepcid AC daily and Tums as needed on a daily bases  She report reflux sometimes even with water  Drinks 1 cup of coffee daily, stays away from soda and fried foods, she's [facility] so has a hard time staying away from sauces  Former smoker, quit many years ago  Denies alcohol use  Denies dysphagia/odynophagia, abdominal pain, unintended weight loss  She does not eat late at night  Denies NSAID use because she is allergic to all of it  She takes fiber pills twice daily for constipation which is improving her symptoms  Denies melena, hematochezia  She was last seen by Dr Jayla Irving for EGD in September 2012 which showed suspected esophagitis and 1 gastric polyp was removed,  Esophageal biopsy showed squamous mucosa with no diagnostic abnormalities or evidence of eosinophilic esophagitis,  Antral biopsy showed changes consistent with reactive gastropathy no H pylori,  And gastric polyp was fundic gland,  No malignancy seen  Last colonoscopy was done 2007 due to constipation with occasional diarrhea and lower abdominal pain which was normal  She has been seen by colorectal due to hemorrhoids and 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     No family history of GI malignancy  REVIEW OF SYSTEMS:    CONSTITUTIONAL: Denies any fever, chills, rigors, and weight loss  HEENT: No earache or tinnitus  CARDIOVASCULAR: No chest pain or palpitations  RESPIRATORY: Denies any cough, hemoptysis, shortness of breath or dyspnea on exertion  GASTROINTESTINAL: As noted in the History of Present Illness  GENITOURINARY: Denies any hematuria or dysuria  NEUROLOGIC: No dizziness or vertigo  MUSCULOSKELETAL: Denies any joint swellings  SKIN: Denies skin rashes or itching  ENDOCRINE: Denies excessive thirst  Denies intolerance to heat or cold    PSYCHOSOCIAL: Denies depression or anxiety  Denies any recent memory loss  Historical Information   Past Medical History:   Diagnosis Date    Abnormal Pap smear of cervix     Allergic rhinitis 2008    Asthma     Depression     Dizziness 2016    Fatigue 2015    GERD (gastroesophageal reflux disease) 2009    Migraine     Nasal congestion 2015    Obesity 2017     Past Surgical History:   Procedure Laterality Date     SECTION      POLYPECTOMY  2019    SINUS SURGERY  2000    TONSILLECTOMY      in 25s     Social History   Social History     Substance and Sexual Activity   Alcohol Use Not Currently     Social History     Substance and Sexual Activity   Drug Use No     Social History     Tobacco Use   Smoking Status Former Smoker    Packs/day: 0 00    Years: 0 00    Pack years: 0 00    Quit date: 2002    Years since quittin 1   Smokeless Tobacco Never Used     Family History   Problem Relation Age of Onset    Hypertension Mother     Depression Mother     Brain cancer Father     Diabetes Paternal Grandmother     Diabetes Paternal Grandfather     No Known Problems Maternal Aunt     No Known Problems Maternal Aunt     Breast cancer Neg Hx     Colon cancer Neg Hx        Meds/Allergies       Current Outpatient Medications:     Xhance 80 MCG/ACT EXHU    ipratropium (ATROVENT) 0 03 % nasal spray    pantoprazole (PROTONIX) 20 mg tablet    Allergies   Allergen Reactions    Aspirin Allergic Rhinitis    Citric Acid Anhydrous [Citric Acid - Food Allergy] Allergic Rhinitis    Corn-Containing Products - Food Allergy Allergic Rhinitis    Ibuprofen Allergic Rhinitis    Naproxen Allergic Rhinitis    Soy Isoflavones Allergic Rhinitis    Citrus - Food Allergy      Sneezing attack    Other Itching     Seasonal / animals           Objective     Blood pressure 133/94, pulse 82, height 5' 2" (1 575 m), weight 89 4 kg (197 lb)  Body mass index is 36 03 kg/m²          PHYSICAL EXAM:      General Appearance:   Alert, cooperative, no distress   HEENT:   Normocephalic, atraumatic, anicteric  Neck:  Supple, symmetrical, trachea midline   Lungs:   Clear to auscultation bilaterally; no rales, rhonchi or wheezing; respirations unlabored    Heart[de-identified]   Regular rate and rhythm; no murmur  Abdomen:   Soft, non-tender, non-distended; normal bowel sounds; no masses, no organomegaly    Genitalia:   Deferred    Rectal:   Deferred    Extremities:  No cyanosis, clubbing or edema    Skin:  No jaundice, rashes, or lesions    Lymph nodes:  No palpable cervical lymphadenopathy        Lab Results:   No visits with results within 1 Day(s) from this visit  Latest known visit with results is:   Orders Only on 11/09/2019   Component Date Value    White Blood Cell Count 11/09/2019 12 0*    Red Blood Cell Count 11/09/2019 4 32     Hemoglobin 11/09/2019 12 7     HCT 11/09/2019 36 7     MCV 11/09/2019 85     MCH 11/09/2019 29 4     MCHC 11/09/2019 34 6     RDW 11/09/2019 13 3     Platelet Count 80/64/9182 210     Neutrophils 11/09/2019 73     Lymphocytes 11/09/2019 18     Monocytes 11/09/2019 8     Eosinophils 11/09/2019 1     Basophils PCT 11/09/2019 0     Neutrophils (Absolute) 11/09/2019 8 7*    Lymphocytes (Absolute) 11/09/2019 2 2     Monocytes (Absolute) 11/09/2019 0 9     Eosinophils (Absolute) 11/09/2019 0 1     Basophils ABS 11/09/2019 0 0     Immature Granulocytes 11/09/2019 0     Immature Granulocytes (A* 11/09/2019 0 0     Glucose, Random 11/09/2019 119*    BUN 11/09/2019 15     Creatinine 11/09/2019 0 83     eGFR Non  11/09/2019 84     eGFR  11/09/2019 96     SL AMB BUN/CREATININE RA* 11/09/2019 18     Sodium 11/09/2019 140     Potassium 11/09/2019 3 5     Chloride 11/09/2019 102     CO2 11/09/2019 25     CALCIUM 11/09/2019 9 1          Radiology Results:   No results found

## 2021-09-13 ENCOUNTER — TELEPHONE (OUTPATIENT)
Dept: GASTROENTEROLOGY | Facility: HOSPITAL | Age: 51
End: 2021-09-13

## 2021-09-21 ENCOUNTER — HOSPITAL ENCOUNTER (OUTPATIENT)
Dept: GASTROENTEROLOGY | Facility: HOSPITAL | Age: 51
Setting detail: OUTPATIENT SURGERY
Discharge: HOME | End: 2021-09-21
Admitting: NURSE PRACTITIONER
Payer: COMMERCIAL

## 2021-09-21 ENCOUNTER — ANESTHESIA EVENT (OUTPATIENT)
Dept: GASTROENTEROLOGY | Facility: HOSPITAL | Age: 51
End: 2021-09-21

## 2021-09-21 ENCOUNTER — ANESTHESIA (OUTPATIENT)
Dept: GASTROENTEROLOGY | Facility: HOSPITAL | Age: 51
End: 2021-09-21

## 2021-09-21 VITALS
WEIGHT: 193.5 LBS | TEMPERATURE: 97.6 F | RESPIRATION RATE: 14 BRPM | SYSTOLIC BLOOD PRESSURE: 133 MMHG | BODY MASS INDEX: 35.61 KG/M2 | HEART RATE: 77 BPM | HEIGHT: 62 IN | OXYGEN SATURATION: 98 % | DIASTOLIC BLOOD PRESSURE: 75 MMHG

## 2021-09-21 DIAGNOSIS — Z12.11 SCREENING FOR MALIGNANT NEOPLASM OF COLON: ICD-10-CM

## 2021-09-21 DIAGNOSIS — K59.00 CONSTIPATION, UNSPECIFIED CONSTIPATION TYPE: ICD-10-CM

## 2021-09-21 DIAGNOSIS — K21.9 GASTROESOPHAGEAL REFLUX DISEASE, UNSPECIFIED WHETHER ESOPHAGITIS PRESENT: ICD-10-CM

## 2021-09-21 LAB
EXT PREGNANCY TEST URINE: NEGATIVE
EXT. CONTROL: NORMAL

## 2021-09-21 PROCEDURE — G0121 COLON CA SCRN NOT HI RSK IND: HCPCS | Performed by: INTERNAL MEDICINE

## 2021-09-21 PROCEDURE — 88305 TISSUE EXAM BY PATHOLOGIST: CPT | Performed by: PATHOLOGY

## 2021-09-21 PROCEDURE — 43239 EGD BIOPSY SINGLE/MULTIPLE: CPT | Performed by: INTERNAL MEDICINE

## 2021-09-21 PROCEDURE — 43251 EGD REMOVE LESION SNARE: CPT | Performed by: INTERNAL MEDICINE

## 2021-09-21 PROCEDURE — 81025 URINE PREGNANCY TEST: CPT | Performed by: ANESTHESIOLOGY

## 2021-09-21 RX ORDER — PROPOFOL 10 MG/ML
INJECTION, EMULSION INTRAVENOUS AS NEEDED
Status: DISCONTINUED | OUTPATIENT
Start: 2021-09-21 | End: 2021-09-21

## 2021-09-21 RX ORDER — LIDOCAINE HYDROCHLORIDE 10 MG/ML
INJECTION, SOLUTION EPIDURAL; INFILTRATION; INTRACAUDAL; PERINEURAL AS NEEDED
Status: DISCONTINUED | OUTPATIENT
Start: 2021-09-21 | End: 2021-09-21

## 2021-09-21 RX ORDER — SODIUM CHLORIDE, SODIUM LACTATE, POTASSIUM CHLORIDE, CALCIUM CHLORIDE 600; 310; 30; 20 MG/100ML; MG/100ML; MG/100ML; MG/100ML
125 INJECTION, SOLUTION INTRAVENOUS CONTINUOUS
Status: DISCONTINUED | OUTPATIENT
Start: 2021-09-21 | End: 2021-09-25 | Stop reason: HOSPADM

## 2021-09-21 RX ORDER — ONDANSETRON 2 MG/ML
INJECTION INTRAMUSCULAR; INTRAVENOUS AS NEEDED
Status: DISCONTINUED | OUTPATIENT
Start: 2021-09-21 | End: 2021-09-21

## 2021-09-21 RX ORDER — DEXAMETHASONE SODIUM PHOSPHATE 4 MG/ML
INJECTION, SOLUTION INTRA-ARTICULAR; INTRALESIONAL; INTRAMUSCULAR; INTRAVENOUS; SOFT TISSUE AS NEEDED
Status: DISCONTINUED | OUTPATIENT
Start: 2021-09-21 | End: 2021-09-21

## 2021-09-21 RX ORDER — SODIUM CHLORIDE, SODIUM LACTATE, POTASSIUM CHLORIDE, CALCIUM CHLORIDE 600; 310; 30; 20 MG/100ML; MG/100ML; MG/100ML; MG/100ML
INJECTION, SOLUTION INTRAVENOUS CONTINUOUS PRN
Status: DISCONTINUED | OUTPATIENT
Start: 2021-09-21 | End: 2021-09-21

## 2021-09-21 RX ORDER — DIPHENHYDRAMINE HYDROCHLORIDE 50 MG/ML
INJECTION INTRAMUSCULAR; INTRAVENOUS AS NEEDED
Status: DISCONTINUED | OUTPATIENT
Start: 2021-09-21 | End: 2021-09-21

## 2021-09-21 RX ADMIN — DIPHENHYDRAMINE HYDROCHLORIDE 50 MG: 50 INJECTION, SOLUTION INTRAMUSCULAR; INTRAVENOUS at 10:23

## 2021-09-21 RX ADMIN — PROPOFOL 40 MG: 10 INJECTION, EMULSION INTRAVENOUS at 10:30

## 2021-09-21 RX ADMIN — PROPOFOL 40 MG: 10 INJECTION, EMULSION INTRAVENOUS at 10:27

## 2021-09-21 RX ADMIN — LIDOCAINE HYDROCHLORIDE 50 MG: 10 INJECTION, SOLUTION EPIDURAL; INFILTRATION; INTRACAUDAL; PERINEURAL at 10:23

## 2021-09-21 RX ADMIN — PROPOFOL 50 MG: 10 INJECTION, EMULSION INTRAVENOUS at 10:23

## 2021-09-21 RX ADMIN — DEXAMETHASONE SODIUM PHOSPHATE 4 MG: 4 INJECTION, SOLUTION INTRAMUSCULAR; INTRAVENOUS at 10:24

## 2021-09-21 RX ADMIN — PROPOFOL 50 MG: 10 INJECTION, EMULSION INTRAVENOUS at 10:24

## 2021-09-21 RX ADMIN — SODIUM CHLORIDE, SODIUM LACTATE, POTASSIUM CHLORIDE, AND CALCIUM CHLORIDE: .6; .31; .03; .02 INJECTION, SOLUTION INTRAVENOUS at 10:17

## 2021-09-21 RX ADMIN — ONDANSETRON 4 MG: 2 INJECTION INTRAMUSCULAR; INTRAVENOUS at 10:18

## 2021-09-21 RX ADMIN — PROPOFOL 20 MG: 10 INJECTION, EMULSION INTRAVENOUS at 10:35

## 2021-09-21 RX ADMIN — PROPOFOL 40 MG: 10 INJECTION, EMULSION INTRAVENOUS at 10:33

## 2021-09-21 NOTE — DISCHARGE INSTRUCTIONS
Gastric Polyps   WHAT YOU NEED TO KNOW:   Gastric polyps are growths that form in the lining of your stomach  They are not cancerous, but certain types of polyps can change into cancer  DISCHARGE INSTRUCTIONS:   Follow up with your healthcare provider as directed: You may need more tests or procedures  Write down any questions so you remember to ask them at your visits  Medicines:   · Medicines may  be given if you have an infection caused by H  pylori bacteria  · Take your medicine as directed  Contact your healthcare provider if you think your medicine is not helping or if you have side effects  Tell him or her if you are allergic to any medicine  Keep a list of the medicines, vitamins, and herbs you take  Include the amounts, and when and why you take them  Bring the list or the pill bottles to follow-up visits  Carry your medicine list with you in case of an emergency  Seek care immediately if:   · You have blood in your vomit  · You have dark bowel movements  · You have severe pain in your abdomen that does not go away after you take medicine  Contact your healthcare provider if:   · You have indigestion that does not go away with treatment  · You vomit after meals  · You have questions or concerns about your condition or care  © Copyright [employer] 2021 Information is for End User's use only and may not be sold, redistributed or otherwise used for commercial purposes  All illustrations and images included in CareNotes® are the copyrighted property of A D A M , Inc  or Darlyn Pierre  The above information is an  only  It is not intended as medical advice for individual conditions or treatments  Talk to your doctor, nurse or pharmacist before following any medical regimen to see if it is safe and effective for you        Upper Endoscopy   WHAT YOU NEED TO KNOW:   An upper endoscopy is also called an upper gastrointestinal (GI) endoscopy, or an esophagogastroduodenoscopy (EGD)  You may feel bloated, gassy, or have some abdominal discomfort after your procedure  Your throat may be sore for 24 to 36 hours  You may burp or pass gas from air that is still inside your body  DISCHARGE INSTRUCTIONS:   Call 911 for any of the following:   · You have sudden chest pain or trouble breathing  Seek care immediately if:   · You feel dizzy or faint  · You have trouble swallowing  · Your bowel movements are very dark or black  · Your abdomen is hard and firm and you have severe pain  · You vomit blood  Contact your healthcare provider if:   · You feel full or bloated and cannot burp or pass gas  · You have not had a bowel movement for 3 days after your procedure  · You have neck pain  · You have a fever or chills  · You have nausea or are vomiting  · You have a rash or hives  · You have questions or concerns about your endoscopy  Relieve a sore throat:  Suck on throat lozenges or crushed ice  Gargle with a small amount of warm salt water  Mix 1 teaspoon of salt and 1 cup of warm water to make salt water  Relieve gas and discomfort from bloating:  Lie on your right side with a heating pad on your abdomen  Take short walks to help pass gas  Eat small meals until bloating is relieved  Rest after your procedure: You have been given medicine to relax you  Do not  drive or make important decisions until the day after your procedure  Return to your normal activity as directed  You can usually return to work the day after your procedure  Follow up with your healthcare provider as directed:  Write down your questions so you remember to ask them during your visits  © 2017 [address] [employer] is for End User's use only and may not be sold, redistributed or otherwise used for commercial purposes   All illustrations and images included in CareNotes® are the copyrighted property of [employer]  or [employer]  The above information is an  only  It is not intended as medical advice for individual conditions or treatments  Talk to your doctor, nurse or pharmacist before following any medical regimen to see if it is safe and effective for you  Colonoscopy   WHAT YOU NEED TO KNOW:   A colonoscopy is a procedure to examine the inside of your colon (intestine) with a scope  Polyps or tissue growths may have been removed during your colonoscopy  It is normal to feel bloated and to have some abdominal discomfort  You should be passing gas  If you have hemorrhoids or you had polyps removed, you may have a small amount of bleeding  DISCHARGE INSTRUCTIONS:   Seek care immediately if:   · You have a large amount of bright red blood in your bowel movements  · Your abdomen is hard and firm and you have severe pain  · You have sudden trouble breathing  Contact your healthcare provider if:   · You develop a rash or hives  · You have a fever within 24 hours of your procedure       · You have not had a bowel movement for 3 days after your procedure  · You have questions or concerns about your condition or care  Activity:   · Do not lift, strain, or run  for 3 days after your procedure  · Rest after your procedure  You have been given medicine to relax you  Do not  drive or make important decisions until the day after your procedure  Return to your normal activity as directed  · Relieve gas and discomfort from bloating  by lying on your right side with a heating pad on your abdomen  You may need to take short walks to help the gas move out  Eat small meals until bloating is relieved  If you had polyps removed: For 7 days after your procedure:  · Do not  take aspirin  · Do not  go on long car rides  Follow up with your healthcare provider as directed:  Write down your questions so you remember to ask them during your visits     © 2017 [employer] is for End User's use only and may not be sold, redistributed or otherwise used for commercial purposes  All illustrations and images included in CareNotes® are the copyrighted property of A [employer] M , Inc  or Adin Briscoe  The above information is an  only  It is not intended as medical advice for individual conditions or treatments  Talk to your doctor, nurse or pharmacist before following any medical regimen to see if it is safe and effective for you  Colonoscopy   WHAT YOU NEED TO KNOW:   A colonoscopy is a procedure to examine the inside of your colon (intestine) with a scope  Polyps or tissue growths may have been removed during your colonoscopy  It is normal to feel bloated and to have some abdominal discomfort  You should be passing gas  If you have hemorrhoids or you had polyps removed, you may have a small amount of bleeding  DISCHARGE INSTRUCTIONS:   Seek care immediately if:   · You have a large amount of bright red blood in your bowel movements  · Your abdomen is hard and firm and you have severe pain  · You have sudden trouble breathing  Contact your healthcare provider if:   · You develop a rash or hives  · You have a fever within 24 hours of your procedure       · You have not had a bowel movement for 3 days after your procedure  · You have questions or concerns about your condition or care  Activity:   · Do not lift, strain, or run  for 3 days after your procedure  · Rest after your procedure  You have been given medicine to relax you  Do not  drive or make important decisions until the day after your procedure  Return to your normal activity as directed  · Relieve gas and discomfort from bloating  by lying on your right side with a heating pad on your abdomen  You may need to take short walks to help the gas move out  Eat small meals until bloating is relieved  If you had polyps removed:   For 7 days after your procedure:  · Do not  take aspirin  · Do not  go on long car rides  Follow up with your healthcare provider as directed:  Write down your questions so you remember to ask them during your visits  © 2017 [employer] is for End User's use only and may not be sold, redistributed or otherwise used for commercial purposes  All illustrations and images included in CareNotes® are the copyrighted property of [employer] Inc  or Adin Briscoe  The above information is an  only  It is not intended as medical advice for individual conditions or treatments  Talk to your doctor, nurse or pharmacist before following any medical regimen to see if it is safe and effective for you

## 2021-09-21 NOTE — H&P
History and Physical - [facility]  Ana Paula Reyes 49 y o  female MRN: [id number]                  HPI: Ana Paula Reyes is a 49y o  year old female who presents for EGD and colonoscopy  History of reflux  Recently switched on to pantoprazole with some relief  Occasional dysphagia  No nausea or vomiting  No history of rectal bleeding or mucus per rectum  REVIEW OF SYSTEMS: Per the HPI, and otherwise unremarkable      Historical Information   Past Medical History:   Diagnosis Date    Abnormal Pap smear of cervix     Allergic rhinitis     Asthma     Depression     Dizziness 2016    Fatigue 2015    GERD (gastroesophageal reflux disease) 2009    Migraine     Nasal congestion 2015    Obesity 2017     Past Surgical History:   Procedure Laterality Date     SECTION      POLYPECTOMY  2019    SINUS SURGERY  2000    TONSILLECTOMY      in 25s     Social History   Social History     Substance and Sexual Activity   Alcohol Use Not Currently     Social History     Substance and Sexual Activity   Drug Use No     Social History     Tobacco Use   Smoking Status Former Smoker    Packs/day: 0 00    Years: 0 00    Pack years: 0 00    Quit date: 2002    Years since quittin 2   Smokeless Tobacco Never Used     Family History   Problem Relation Age of Onset    Hypertension Mother     Depression Mother     Brain cancer Father     Diabetes Paternal Grandmother     Diabetes Paternal Grandfather     No Known Problems Maternal Aunt     No Known Problems Maternal Aunt     Breast cancer Neg Hx     Colon cancer Neg Hx        Meds/Allergies       Current Outpatient Medications:     pantoprazole (PROTONIX) 20 mg tablet    Xhance 93 MCG/ACT EXHU    ipratropium (ATROVENT) 0 03 % nasal spray    Current Facility-Administered Medications:     lactated ringers infusion, 125 mL/hr, Intravenous, Continuous    Allergies   Allergen Reactions    Aspirin Allergic Rhinitis    Citric Acid Anhydrous [Citric Acid - Food Allergy] Allergic Rhinitis    Corn-Containing Products - Food Allergy Allergic Rhinitis    Ibuprofen Allergic Rhinitis    Naproxen Allergic Rhinitis    Soy Isoflavones Allergic Rhinitis    Citrus - Food Allergy      Sneezing attack    Other Itching     Seasonal / animals       Objective     /92   Pulse 69   Temp 97 6 °F (36 4 °C) (Temporal)   Resp 16   Ht 5' 2" (1 575 m)   Wt 87 8 kg (193 lb 8 oz)   SpO2 100%   BMI 35 39 kg/m²       PHYSICAL EXAM    Gen: NAD  Head: NCAT  CV: RRR  CHEST: Clear  ABD: soft, NT/ND  EXT: no edema      ASSESSMENT/PLAN:  This is a 49y o  year old female here for EGD and colonoscopy, and she is stable and optimized for her procedure

## 2021-09-21 NOTE — DISCHARGE SUMMARY
Discharge Summary - Robert Vidal 49 y o  female MRN: [id number]    Unit/Bed#:  Encounter: [id number]    Admission Date:  09/21/2021    Admitting Diagnosis: Constipation, unspecified constipation type [K59 00]  Screening for malignant neoplasm of colon [Z12 11]  Gastroesophageal reflux disease, unspecified whether esophagitis present [K21 9]    HPI:  Underwent upper endoscopy and colonoscopy  Screening colonoscopy and reflux    Procedures Performed: No orders of the defined types were placed in this encounter  Summary of Hospital Course: Tolerated procedure well  See procedure notes    Significant Findings, Care, Treatment and Services Provided:  Pre procedure note    Complications:  None    Discharge Diagnosis:  Small sliding hiatal hernia and reflux  Gastric polyp  Normal colonoscopy  Medical Problems     Resolved Problems  Date Reviewed: 9/21/2021    None                Condition at Discharge: good         Discharge instructions/Information to patient and family:   See after visit summary for information provided to patient and family  Provisions for Follow-Up Care:  See after visit summary for information related to follow-up care and any pertinent home health orders        PCP: Elsa Orozco DO    Disposition: Home

## 2021-09-21 NOTE — INTERVAL H&P NOTE
H&P reviewed  After examining the patient I find no changes in the patients condition since the H&P had been written      Vitals:    09/21/21 0923   BP: 141/92   Pulse: 69   Resp: 16   Temp: 97 6 °F (36 4 °C)   SpO2: 100%

## 2021-09-21 NOTE — ANESTHESIA PREPROCEDURE EVALUATION
Procedure:  COLONOSCOPY  EGD    Relevant Problems   GI/HEPATIC   (+) Esophageal reflux      PULMONARY   (+) Samter's triad        Physical Exam    Airway    Mallampati score: III  TM Distance: >3 FB  Neck ROM: full     Dental   No notable dental hx     Cardiovascular  Rhythm: regular, Rate: normal, Cardiovascular exam normal    Pulmonary  Pulmonary exam normal Breath sounds clear to auscultation,     Other Findings        Anesthesia Plan  ASA Score- 2     Anesthesia Type- IV sedation with anesthesia with ASA Monitors  Additional Monitors:   Airway Plan:           Plan Factors-Exercise tolerance (METS): >4 METS  Chart reviewed  Existing labs reviewed  Patient is not a current smoker  Patient instructed to abstain from smoking on day of procedure  Patient did not smoke on day of surgery  Obstructive sleep apnea risk education given perioperatively  Induction- intravenous  Postoperative Plan-     Informed Consent- Anesthetic plan and risks discussed with patient  I personally reviewed this patient with the CRNA  Discussed and agreed on the Anesthesia Plan with the CRNA  Edil Stein

## 2021-09-21 NOTE — ANESTHESIA POSTPROCEDURE EVALUATION
Post-Op Assessment Note    CV Status:  Stable  Pain Score: 0    Pain management: adequate     Mental Status:  Sleepy and arousable   Hydration Status:  Euvolemic   PONV Controlled:  Controlled   Airway Patency:  Patent      Post Op Vitals Reviewed: Yes      Staff: CRNA         No complications documented      /69 (09/21/21 1047)    Temp (!) 97 2 °F (36 2 °C) (09/21/21 1047)    Pulse 101 (sinus tachy) (09/21/21 1047)   Resp 18 (09/21/21 1047)    SpO2 100 % (09/21/21 1047)

## 2021-11-01 ENCOUNTER — OFFICE VISIT (OUTPATIENT)
Dept: OTOLARYNGOLOGY | Facility: CLINIC | Age: 51
End: 2021-11-01
Payer: COMMERCIAL

## 2021-11-01 VITALS — BODY MASS INDEX: 34.96 KG/M2 | WEIGHT: 190 LBS | TEMPERATURE: 98.1 F | HEIGHT: 62 IN

## 2021-11-01 DIAGNOSIS — J33.9 NASAL POLYPS: Primary | ICD-10-CM

## 2021-11-01 DIAGNOSIS — J32.8 OTHER CHRONIC SINUSITIS: ICD-10-CM

## 2021-11-01 DIAGNOSIS — J45.909 SAMTER'S TRIAD: ICD-10-CM

## 2021-11-01 DIAGNOSIS — Z88.6 SAMTER'S TRIAD: ICD-10-CM

## 2021-11-01 DIAGNOSIS — J33.9 SAMTER'S TRIAD: ICD-10-CM

## 2021-11-01 DIAGNOSIS — R09.82 POST-NASAL DRIP: ICD-10-CM

## 2021-11-01 PROCEDURE — 99213 OFFICE O/P EST LOW 20 MIN: CPT | Performed by: SPECIALIST

## 2021-11-01 PROCEDURE — 31231 NASAL ENDOSCOPY DX: CPT | Performed by: SPECIALIST

## 2021-11-01 RX ORDER — FLUTICASONE PROPIONATE 93 UG/1
1 SPRAY, METERED NASAL 2 TIMES DAILY
Qty: 16 ML | Refills: 6 | Status: SHIPPED | OUTPATIENT
Start: 2021-11-01

## 2021-12-30 DIAGNOSIS — K21.9 GASTROESOPHAGEAL REFLUX DISEASE, UNSPECIFIED WHETHER ESOPHAGITIS PRESENT: ICD-10-CM

## 2022-01-01 RX ORDER — PANTOPRAZOLE SODIUM 20 MG/1
20 TABLET, DELAYED RELEASE ORAL DAILY
Qty: 30 TABLET | Refills: 2 | Status: SHIPPED | OUTPATIENT
Start: 2022-01-01

## 2022-04-11 ENCOUNTER — OFFICE VISIT (OUTPATIENT)
Dept: FAMILY MEDICINE CLINIC | Facility: CLINIC | Age: 52
End: 2022-04-11
Payer: COMMERCIAL

## 2022-04-11 VITALS
HEART RATE: 92 BPM | HEIGHT: 62 IN | RESPIRATION RATE: 14 BRPM | OXYGEN SATURATION: 99 % | DIASTOLIC BLOOD PRESSURE: 72 MMHG | BODY MASS INDEX: 35.51 KG/M2 | TEMPERATURE: 98.2 F | WEIGHT: 193 LBS | SYSTOLIC BLOOD PRESSURE: 122 MMHG

## 2022-04-11 DIAGNOSIS — Z11.59 NEED FOR HEPATITIS C SCREENING TEST: ICD-10-CM

## 2022-04-11 DIAGNOSIS — Z13.0 SCREENING FOR DEFICIENCY ANEMIA: ICD-10-CM

## 2022-04-11 DIAGNOSIS — Z23 NEED FOR VACCINATION: ICD-10-CM

## 2022-04-11 DIAGNOSIS — Z00.00 ANNUAL PHYSICAL EXAM: Primary | ICD-10-CM

## 2022-04-11 DIAGNOSIS — Z79.899 ENCOUNTER FOR LONG-TERM CURRENT USE OF MEDICATION: ICD-10-CM

## 2022-04-11 DIAGNOSIS — Z13.6 SCREENING FOR CARDIOVASCULAR CONDITION: ICD-10-CM

## 2022-04-11 PROBLEM — S93.492A SPRAIN OF ANTERIOR TALOFIBULAR LIGAMENT OF LEFT ANKLE: Status: RESOLVED | Noted: 2019-06-06 | Resolved: 2022-04-11

## 2022-04-11 PROBLEM — S93.602A SPRAIN OF LEFT FOOT: Status: RESOLVED | Noted: 2019-06-06 | Resolved: 2022-04-11

## 2022-04-11 PROBLEM — Z12.11 SCREENING FOR MALIGNANT NEOPLASM OF COLON: Status: RESOLVED | Noted: 2019-06-10 | Resolved: 2022-04-11

## 2022-04-11 PROCEDURE — 99396 PREV VISIT EST AGE 40-64: CPT | Performed by: FAMILY MEDICINE

## 2022-04-11 PROCEDURE — 90714 TD VACC NO PRESV 7 YRS+ IM: CPT

## 2022-04-11 PROCEDURE — 90471 IMMUNIZATION ADMIN: CPT

## 2022-04-11 NOTE — PROGRESS NOTES
FAMILY PRACTICE HEALTH MAINTENANCE OFFICE VISIT  [facility]    NAME: Rigoberto Samuels  AGE: 49 y o  SEX: female  : 1970     DATE: 2022    Assessment and Plan     1  Annual physical exam    2  Need for hepatitis C screening test  Comments:  declined    3  Need for vaccination  -     TD VACCINE GREATER THAN OR EQUAL TO 8YO PRESERVATIVE FREE IM    4  Screening for deficiency anemia  -     CBC; Future  -     CBC    5  Screening for cardiovascular condition  -     Comprehensive metabolic panel; Future  -     Lipid Panel with Direct LDL reflex; Future  -     Comprehensive metabolic panel  -     Lipid Panel with Direct LDL reflex    6  Encounter for long-term current use of medication  -     TSH, 3rd generation; Future  -     TSH, 3rd generation        · Patient Counseling:   · Nutrition: Stressed importance of a well balanced diet, moderation of sodium/saturated fat, caloric balance and sufficient intake of fiber  · Exercise: Stressed the importance of regular exercise with a goal of 150 minutes per week  · Dental Health: Discussed daily flossing and brushing and regular dental visits     · Immunizations reviewed: declined Shingles vaccine   · Discussed benefits of:  Mammogram , Cervical Cancer screening and Screening labs   BMI Counseling: Body mass index is 35 3 kg/m²  Discussed with patient's BMI with her  The BMI is above normal  Exercise recommendations include exercising 3-5 times per week  Return in about 1 year (around 2023) for Annual physical     Patient opted to have just TD not Adacel  She has many allergies and wanted to limit what she is getting  Chief Complaint     Chief Complaint   Patient presents with    Physical Exam     [id number]       History of Present Illness     She is going to be moving to [place] and has bought property  She hopes to be there in 3 years  She is currently building a cabin there         Well Adult Physical Patient here for a comprehensive physical exam       Diet and Physical Activity  Diet: well balanced diet  Exercise: frequently      Depression Screen  PHQ-2/9 Depression Screening    Little interest or pleasure in doing things: 0 - not at all  Feeling down, depressed, or hopeless: 0 - not at all  PHQ-2 Score: 0  PHQ-2 Interpretation: Negative depression screen          General Health  Hearing: Normal:  bilateral  Vision: wears glasses  Dental: regular dental visits    Reproductive Health  Irregular Periods and Follows with gynecologist      The following portions of the patient's history were reviewed and updated as appropriate: allergies, current medications, past family history, past medical history, past social history, past surgical history and problem list     Review of Systems     Review of Systems   Constitutional: Negative  Respiratory: Negative  Cardiovascular: Negative          Past Medical History     Past Medical History:   Diagnosis Date    Abnormal Pap smear of cervix     Allergic rhinitis 2008    Asthma     Depression     Dizziness 2016    Fatigue 2015    GERD (gastroesophageal reflux disease) 2009    Migraine     Nasal congestion 2015    Obesity 2017       Past Surgical History     Past Surgical History:   Procedure Laterality Date     SECTION      POLYPECTOMY  2019    SINUS SURGERY  2000    TONSILLECTOMY      in 25s       Social History     Social History     Socioeconomic History    Marital status: /Civil Union     Spouse name: None    Number of children: None    Years of education: None    Highest education level: None   Occupational History    None   Tobacco Use    Smoking status: Former Smoker     Packs/day: 0 00     Years: 0 00     Pack years: 0 00     Quit date: 2002     Years since quittin 8    Smokeless tobacco: Never Used   Vaping Use    Vaping Use: Former   Substance and Sexual Activity    Alcohol use: Not Currently    Drug use: No    Sexual activity: Yes     Partners: Male     Birth control/protection: Male Sterilization   Other Topics Concern    None   Social History Narrative    Exercise habits: denied      Social Determinants of Health     Financial Resource Strain: Not on file   Food Insecurity: Not on file   Transportation Needs: Not on file   Physical Activity: Not on file   Stress: Not on file   Social Connections: Not on file   Intimate Partner Violence: Not on file   Housing Stability: Not on file       Family History     Family History   Problem Relation Age of Onset    Hypertension Mother     Depression Mother     Brain cancer Father     Diabetes Paternal Grandmother     Diabetes Paternal Grandfather     No Known Problems Maternal Aunt     No Known Problems Maternal Aunt     Breast cancer Neg Hx     Colon cancer Neg Hx        Current Medications       Current Outpatient Medications:     pantoprazole (PROTONIX) 20 mg tablet, Take 1 tablet (20 mg total) by mouth daily, Disp: 30 tablet, Rfl: 2    Fluticasone Propionate (Xhance) 93 MCG/ACT EXHU, 1 spray by Each Nare route 2 (two) times a day (Patient not taking: Reported on 4/11/2022 ), Disp: 16 mL, Rfl: 6    ipratropium (ATROVENT) 0 03 % nasal spray, ipratropium bromide 21 mcg (0 03 %) nasal spray (Patient not taking: Reported on 8/4/2021), Disp: , Rfl:      Allergies     Allergies   Allergen Reactions    Aspirin Allergic Rhinitis    Citric Acid Anhydrous [Citric Acid - Food Allergy] Allergic Rhinitis    Corn-Containing Products - Food Allergy Allergic Rhinitis    Ibuprofen Allergic Rhinitis    Naproxen Allergic Rhinitis    Soy Isoflavones Allergic Rhinitis    Citrus - Food Allergy      Sneezing attack    Other Itching     Seasonal / animals       Objective     /72   Pulse 92   Temp 98 2 °F (36 8 °C)   Resp 14   Ht 5' 2" (1 575 m)   Wt 87 5 kg (193 lb)   LMP 04/01/2022   SpO2 99%   BMI 35 30 kg/m²      Physical Exam  Vitals and nursing note reviewed  Constitutional:       Appearance: She is well-developed  HENT:      Head: Normocephalic and atraumatic  Right Ear: Tympanic membrane and external ear normal       Left Ear: Tympanic membrane and external ear normal    Cardiovascular:      Rate and Rhythm: Normal rate and regular rhythm  Heart sounds: Normal heart sounds  No murmur heard  No friction rub  Pulmonary:      Effort: Pulmonary effort is normal  No respiratory distress  Breath sounds: Normal breath sounds  No wheezing or rales  Abdominal:      General: There is no distension  Palpations: Abdomen is soft  There is no mass  Tenderness: There is no abdominal tenderness  There is no guarding or rebound  Musculoskeletal:      Cervical back: Normal range of motion  Right lower leg: No edema  Left lower leg: No edema  Skin:     General: Skin is warm  Neurological:      Mental Status: She is alert and oriented to person, place, and time     Psychiatric:         Mood and Affect: Mood normal             Visual Acuity Screening    Right eye Left eye Both eyes   Without correction:      With correction: 20/25 20/25 0736 [facility]

## 2022-10-19 ENCOUNTER — OFFICE VISIT (OUTPATIENT)
Dept: PODIATRY | Facility: CLINIC | Age: 52
End: 2022-10-19
Payer: COMMERCIAL

## 2022-10-19 VITALS — RESPIRATION RATE: 17 BRPM | BODY MASS INDEX: 35.51 KG/M2 | HEIGHT: 62 IN | WEIGHT: 193 LBS

## 2022-10-19 DIAGNOSIS — B35.1 ONYCHOMYCOSIS: ICD-10-CM

## 2022-10-19 DIAGNOSIS — M72.2 PLANTAR FASCIITIS: Primary | ICD-10-CM

## 2022-10-19 DIAGNOSIS — M21.41 ACQUIRED FLAT FOOT, RIGHT: ICD-10-CM

## 2022-10-19 DIAGNOSIS — B07.0 PLANTAR WARTS: ICD-10-CM

## 2022-10-19 DIAGNOSIS — M21.42 ACQUIRED FLAT FOOT, LEFT: ICD-10-CM

## 2022-10-19 PROCEDURE — 99212 OFFICE O/P EST SF 10 MIN: CPT | Performed by: PODIATRIST

## 2022-10-19 PROCEDURE — L3000 FT INSERT UCB BERKELEY SHELL: HCPCS | Performed by: PODIATRIST

## 2022-10-19 RX ORDER — KETOCONAZOLE 20 MG/G
CREAM TOPICAL DAILY
Qty: 60 G | Refills: 1 | Status: SHIPPED | OUTPATIENT
Start: 2022-10-19 | End: 2022-11-18

## 2022-10-19 RX ORDER — MELOXICAM 15 MG/1
15 TABLET ORAL DAILY
Qty: 30 TABLET | Refills: 0 | Status: SHIPPED | OUTPATIENT
Start: 2022-10-19 | End: 2022-11-18

## 2022-10-19 NOTE — PROGRESS NOTES
Assessment/Plan:  Chronic pain secondary to plantar fasciitis bilateral   Right greater than left  Acquired deformity foot  Acquired pes planus  Verruca x2 right heel  Superficial mycosis nails  Plan  Foot exam performed  Patient educated on condition  Patient would benefit from pronation control  Her feet have been casted for custom molded foot orthotics  She will use these daily  Patient has been placed on Mobic  Cantharone applied to right foot lesions  Patient be started on topical antifungal   Return for follow-up         Diagnoses and all orders for this visit:    Plantar fasciitis  -     meloxicam (MOBIC) 15 mg tablet; Take 1 tablet (15 mg total) by mouth daily    Acquired flat foot, right    Acquired flat foot, left    Onychomycosis  -     ketoconazole (NIZORAL) 2 % cream; Apply topically daily    Plantar warts          Subjective:  Patient has several complaints  She has chronic heel pain  She has failed all conservative measures  She would like new orthotics  She has pain in each heel upon rising  Right greater than left  Her toenails have turned white since she had a pedicure    She also has painful skin lesions of the right heel    Allergies   Allergen Reactions   • Aspirin Allergic Rhinitis   • Citric Acid Anhydrous [Citric Acid - Food Allergy] Allergic Rhinitis   • Corn-Containing Products - Food Allergy Allergic Rhinitis   • Ibuprofen Allergic Rhinitis   • Naproxen Allergic Rhinitis   • Soy Isoflavones Allergic Rhinitis   • Citrus - Food Allergy      Sneezing attack   • Other Itching     Seasonal / animals         Current Outpatient Medications:   •  ketoconazole (NIZORAL) 2 % cream, Apply topically daily, Disp: 60 g, Rfl: 1  •  meloxicam (MOBIC) 15 mg tablet, Take 1 tablet (15 mg total) by mouth daily, Disp: 30 tablet, Rfl: 0  •  Fluticasone Propionate (Xhance) 93 MCG/ACT EXHU, 1 spray by Each Nare route 2 (two) times a day (Patient not taking: Reported on 4/11/2022 ), Disp: 16 mL, Rfl: 6  •  ipratropium (ATROVENT) 0 03 % nasal spray, ipratropium bromide 21 mcg (0 03 %) nasal spray (Patient not taking: Reported on 8/4/2021), Disp: , Rfl:   •  pantoprazole (PROTONIX) 20 mg tablet, Take 1 tablet (20 mg total) by mouth daily, Disp: 30 tablet, Rfl: 2    Patient Active Problem List   Diagnosis   • Allergic rhinitis   • Apnea   • Neck pain   • Congenital pes planus of left foot   • Congenital pes planus of right foot   • Esophageal reflux   • Hemorrhoids   • Lichen sclerosus et atrophicus   • Obesity   • Plantar fasciitis   • Pain in both feet   • Acquired deformity of left foot   • Left foot pain   • Nontraumatic tear of plantar fascia   • Anal fistula   • Anal fissure   • Dense breast   • Impaired fasting glucose   • Nasal polyps   • Chronic sinusitis   • Hyposmia   • Samter's triad   • Post-nasal drip          Patient ID: Sameera Julio is a 47 y o  female  HPI    The following portions of the patient's history were reviewed and updated as appropriate:     family history includes Brain cancer in her father; Depression in her mother; Diabetes in her paternal grandfather and paternal grandmother; Hypertension in her mother; No Known Problems in her maternal aunt and maternal aunt  reports that she quit smoking about 20 years ago  She smoked 0 00 packs per day for 0 00 years  She has never used smokeless tobacco  She reports previous alcohol use  She reports that she does not use drugs  Vitals:    10/19/22 1628   Resp: 17       Review of Systems      Objective:  Patient's shoes and socks removed  Foot ExamPhysical Exam      Patient's shoes and socks removed     Foot ExamPhysical Exam          General  General Appearance: appears stated age and healthy   Orientation: alert and oriented to person, place, and time   Affect: appropriate   Gait: antalgic         Right Foot/Ankle      Inspection and Palpation  Ecchymosis: none  Tenderness: none   Swelling: metatarsals   Arch: pes planus  Hammertoes: fifth toe  Hallux valgus: no  Hallux limitus: yes  Skin Exam: dry skin;      Neurovascular  Dorsalis pedis: 2+  Posterior tibial: 3+  Saphenous nerve sensation: normal  Tibial nerve sensation: normal  Superficial peroneal nerve sensation: normal  Deep peroneal nerve sensation: normal  Sural nerve sensation: normal  Achilles reflex: 2+  Babinski reflex: 2+        Left Foot/Ankle       Inspection and Palpation  Ecchymosis: none  Tenderness: bony tenderness, plantar fascia and calcaneus tenderness   Swelling: metatarsals and plantar fascia   Arch: pes planus  Hammertoes: fifth toe  Hallux valgus: no  Hallux limitus: yes  Skin Exam: dry skin;      Neurovascular  Dorsalis pedis: 2+  Posterior tibial: 3+  Saphenous nerve sensation: normal  Tibial nerve sensation: normal  Superficial peroneal nerve sensation: normal  Deep peroneal nerve sensation: normal  Sural nerve sensation: normal  Achilles reflex: 2+  Babinski reflex: 2+           Physical Exam   Constitutional: She is oriented to person, place, and time  She appears well-developed and well-nourished  Cardiovascular: Normal rate and regular rhythm  Pulses:       Dorsalis pedis pulses are 2+ on the right side, and 2+ on the left side         Posterior tibial pulses are 3+ on the right side, and 3+ on the left side  Musculoskeletal:        Left foot: There is bony tenderness    Patient is maximally pronated in stance and gait   There is pain with palpation left plantar fascia insertion   Negative Tinel sign   Negative pain with tuning fork test of left heel  X-ray demonstrates plantar calcaneal spurring bilateral        Feet:   Right Foot:   Skin Integrity: Positive for dry skin  Patient demonstrates 2 skin lesions consistent with atypical verruca posterior plantar aspect right heel  In addition all nails are dystrophic  They have superficial mycosis    Left Foot:   Skin Integrity: Positive for dry skin      Neurological: She is alert and oriented to person, place, and time  Reflex Scores:       Achilles reflexes are 2+ on the right side and 2+ on the left side  Skin: Skin is warm and dry   Capillary refill takes less than 2 seconds    Vitals reviewed

## 2022-11-03 ENCOUNTER — OFFICE VISIT (OUTPATIENT)
Dept: PODIATRY | Facility: CLINIC | Age: 52
End: 2022-11-03

## 2022-11-03 VITALS — HEIGHT: 62 IN | BODY MASS INDEX: 35.51 KG/M2 | WEIGHT: 193 LBS | RESPIRATION RATE: 17 BRPM

## 2022-11-03 DIAGNOSIS — M79.671 RIGHT FOOT PAIN: ICD-10-CM

## 2022-11-03 DIAGNOSIS — B35.1 ONYCHOMYCOSIS: ICD-10-CM

## 2022-11-03 DIAGNOSIS — M72.2 PLANTAR FASCIITIS: Primary | ICD-10-CM

## 2022-11-03 DIAGNOSIS — B07.0 PLANTAR WARTS: ICD-10-CM

## 2022-11-03 NOTE — PROGRESS NOTES
Assessment/Plan:  Chronic pain right heel  Heel spur syndrome/plantar fasciitis, recalcitrant  Plantar verruca right heel  Resolving mycosis of nail  Plan  Foot exam performed  Patient educated on condition  Right heel lesion debrided  Cantharone applied  Patient will continue take Mobic  She will use orthotics  Patient is a candidate for surgical intervention of of right heel pain cure  She will use topical antifungal   Return p r n  Diagnoses and all orders for this visit:    Plantar fasciitis    Plantar warts    Right foot pain    Onychomycosis          Subjective:  Patient is doing better with the wart however she still has right heel pain    She is using topical antifungal    Allergies   Allergen Reactions   • Aspirin Allergic Rhinitis   • Citric Acid Anhydrous [Citric Acid - Food Allergy] Allergic Rhinitis   • Corn-Containing Products - Food Allergy Allergic Rhinitis   • Ibuprofen Allergic Rhinitis   • Isoflavones (Soy) Allergic Rhinitis   • Naproxen Allergic Rhinitis   • Citrus - Food Allergy      Sneezing attack   • Other Itching     Seasonal / animals         Current Outpatient Medications:   •  Fluticasone Propionate (Xhance) 93 MCG/ACT EXHU, 1 spray by Each Nare route 2 (two) times a day (Patient not taking: Reported on 4/11/2022 ), Disp: 16 mL, Rfl: 6  •  ipratropium (ATROVENT) 0 03 % nasal spray, ipratropium bromide 21 mcg (0 03 %) nasal spray (Patient not taking: Reported on 8/4/2021), Disp: , Rfl:   •  ketoconazole (NIZORAL) 2 % cream, Apply topically daily, Disp: 60 g, Rfl: 1  •  meloxicam (MOBIC) 15 mg tablet, Take 1 tablet (15 mg total) by mouth daily, Disp: 30 tablet, Rfl: 0  •  pantoprazole (PROTONIX) 20 mg tablet, Take 1 tablet (20 mg total) by mouth daily, Disp: 30 tablet, Rfl: 2    Patient Active Problem List   Diagnosis   • Allergic rhinitis   • Apnea   • Neck pain   • Congenital pes planus of left foot   • Congenital pes planus of right foot   • Esophageal reflux   • Hemorrhoids   • Lichen sclerosus et atrophicus   • Obesity   • Plantar fasciitis   • Pain in both feet   • Acquired deformity of left foot   • Left foot pain   • Nontraumatic tear of plantar fascia   • Anal fistula   • Anal fissure   • Dense breast   • Impaired fasting glucose   • Nasal polyps   • Chronic sinusitis   • Hyposmia   • Samter's triad   • Post-nasal drip          Patient ID: Ashish Shields is a 47 y o  female  HPI    The following portions of the patient's history were reviewed and updated as appropriate:     family history includes Brain cancer in her father; Depression in her mother; Diabetes in her paternal grandfather and paternal grandmother; Hypertension in her mother; No Known Problems in her maternal aunt and maternal aunt  reports that she quit smoking about 20 years ago  She smoked 0 00 packs per day for 0 00 years  She has never used smokeless tobacco  She reports previous alcohol use  She reports that she does not use drugs  Vitals:    11/03/22 1643   Resp: 17       Review of Systems      Objective:  Patient's shoes and socks removed     Foot ExamPhysical Exam        Patient's shoes and socks removed    Foot ExamPhysical Exam          General  General Appearance: appears stated age and healthy   Orientation: alert and oriented to person, place, and time   Affect: appropriate   Gait: antalgic         Right Foot/Ankle      Inspection and Palpation  Ecchymosis: none  Tenderness: none   Swelling: metatarsals   Arch: pes planus  Hammertoes: fifth toe  Hallux valgus: no  Hallux limitus: yes  Skin Exam: dry skin;      Neurovascular  Dorsalis pedis: 2+  Posterior tibial: 3+  Saphenous nerve sensation: normal  Tibial nerve sensation: normal  Superficial peroneal nerve sensation: normal  Deep peroneal nerve sensation: normal  Sural nerve sensation: normal  Achilles reflex: 2+  Babinski reflex: 2+        Left Foot/Ankle       Inspection and Palpation  Ecchymosis: none  Tenderness: bony tenderness, plantar fascia and calcaneus tenderness   Swelling: metatarsals and plantar fascia   Arch: pes planus  Hammertoes: fifth toe  Hallux valgus: no  Hallux limitus: yes  Skin Exam: dry skin;      Neurovascular  Dorsalis pedis: 2+  Posterior tibial: 3+  Saphenous nerve sensation: normal  Tibial nerve sensation: normal  Superficial peroneal nerve sensation: normal  Deep peroneal nerve sensation: normal  Sural nerve sensation: normal  Achilles reflex: 2+  Babinski reflex: 2+           Physical Exam   Constitutional: She is oriented to person, place, and time  She appears well-developed and well-nourished  Cardiovascular: Normal rate and regular rhythm  Pulses:       Dorsalis pedis pulses are 2+ on the right side, and 2+ on the left side         Posterior tibial pulses are 3+ on the right side, and 3+ on the left side  Musculoskeletal:        Left foot: There is bony tenderness    Patient is maximally pronated in stance and gait   There is pain with palpation left plantar fascia insertion   Negative Tinel sign   Negative pain with tuning fork test of left heel  X-ray demonstrates plantar calcaneal spurring bilateral          Feet:   Right Foot:   Skin Integrity: Positive for dry skin  Patient demonstrates 2 skin lesions consistent with atypical verruca posterior plantar aspect right heel  In addition all nails are dystrophic  They have superficial mycosis     Left Foot:   Skin Integrity: Positive for dry skin  Neurological: She is alert and oriented to person, place, and time  Reflex Scores:       Achilles reflexes are 2+ on the right side and 2+ on the left side  Skin: Skin is warm and dry   Capillary refill takes less than 2 seconds    Vitals reviewed

## 2023-08-23 ENCOUNTER — TELEPHONE (OUTPATIENT)
Dept: OBGYN CLINIC | Facility: CLINIC | Age: 53
End: 2023-08-23

## 2023-08-24 ENCOUNTER — OFFICE VISIT (OUTPATIENT)
Dept: OBGYN CLINIC | Facility: CLINIC | Age: 53
End: 2023-08-24
Payer: COMMERCIAL

## 2023-08-24 VITALS
BODY MASS INDEX: 36.03 KG/M2 | WEIGHT: 195.8 LBS | DIASTOLIC BLOOD PRESSURE: 78 MMHG | HEIGHT: 62 IN | SYSTOLIC BLOOD PRESSURE: 122 MMHG

## 2023-08-24 DIAGNOSIS — N93.9 ABNORMAL UTERINE BLEEDING: Primary | ICD-10-CM

## 2023-08-24 PROCEDURE — 99214 OFFICE O/P EST MOD 30 MIN: CPT | Performed by: OBSTETRICS & GYNECOLOGY

## 2023-08-24 PROCEDURE — 58100 BIOPSY OF UTERUS LINING: CPT | Performed by: OBSTETRICS & GYNECOLOGY

## 2023-08-24 NOTE — PROGRESS NOTES
Assessment/Plan:     Diagnoses and all orders for this visit:    Abnormal uterine bleeding  -     US pelvis complete w transvaginal; Future  -     Endometrial biopsy    Discussed with patient recommendation for EMB to be performed due to acute change and stop in bleeding. Discussed possible causes for abnormal bleeding specifically structural causes such as fibroids, polyps, and endometrial changes that the biopsy could provide a diagnosis or rule out as a cause. Patient was amenable to having EMB performed and consent was obtained. Patient was unable to tolerate the EMB procedure and it was aborted prior to obtaining a tissue sample. Ultrasound was recommended for patient and ordered. Will follow up with results of ultrasound and further recommendations for evaluation. Discussed likelihood of needing hysteroscopy, D&C performed for further evaluation and possible treatment. Other orders  -     Omeprazole Magnesium (PRILOSEC PO); Take by mouth as needed  -     Famotidine (PEPCID PO); Take by mouth as needed          Subjective:    Patient ID: Bayron Marques is a 47 y.o. female. Chief Complaint   Patient presents with   • Menstrual Problem     Patient states Tuesday night she started with heavy vaginal bleeding and pain. She states the pain was a feeling like she was in labor. The pain and bleeding did subside in the afternoon yesterday. Carolina Rosario is a 48yo  presenting today for a gyn problem visit. She reports that she has been having sporadic menses occur, which she attributed to menopause. Her menses have been normal with mild cramping and normal flow. She reports that she had her menses starting 23 and it ended on 2023. She then started bleeding again on 2023 however she was experiencing significant pelvic pain and heavy bleeding, much heavier than she normally experiences with her menses.  She states that the pain and the bleeding were severe for about 10 hours until it subsided to a degree that she felt was comparable to her normal menses. She was last seen for her well woman annual visit in October 2020. At that time she was experiencing a few skipped menses but no other concerns were noted. The following portions of the patient's history were reviewed and updated as appropriate: allergies, current medications, past family history, past medical history, past social history, past surgical history and problem list.    Review of Systems   Constitutional: Negative for chills, diaphoresis and fever. Respiratory: Negative for shortness of breath. Gastrointestinal: Negative for abdominal pain, diarrhea, nausea and vomiting. Genitourinary: Positive for menstrual problem, pelvic pain and vaginal bleeding. Negative for difficulty urinating, vaginal discharge and vaginal pain. Neurological: Negative for dizziness, weakness and headaches. Objective:  /78 (BP Location: Right arm, Patient Position: Sitting, Cuff Size: Standard)   Ht 5' 2" (1.575 m)   Wt 88.8 kg (195 lb 12.8 oz)   LMP 07/30/2023   BMI 35.81 kg/m²     Physical Exam  Constitutional:       General: She is not in acute distress. Appearance: Normal appearance. She is obese. She is not diaphoretic. Genitourinary:      Vulva normal.      Right Labia: No rash, tenderness or lesions. Left Labia: No tenderness, lesions or rash. Vaginal bleeding present. No vaginal discharge, erythema or tenderness. No vaginal prolapse present. No vaginal atrophy present. Cervix is nulliparous. No cervical motion tenderness, discharge, friability, lesion or polyp. Uterus is not prolapsed. No uterine mass detected. Uterus is midaxial.   HENT:      Head: Normocephalic and atraumatic. Pulmonary:      Effort: Pulmonary effort is normal. No respiratory distress. Musculoskeletal:      Right lower leg: No edema. Left lower leg: No edema.    Neurological:      Mental Status: She is alert and oriented to person, place, and time. Skin:     General: Skin is warm and dry. Coloration: Skin is not pale. Psychiatric:         Mood and Affect: Mood normal.         Behavior: Behavior normal.         Thought Content: Thought content normal.         Judgment: Judgment normal.   Vitals and nursing note reviewed. Endometrial biopsy    Date/Time: 8/24/2023 2:30 PM    Performed by: Sean Rangel MD  Authorized by: Sean Rangel MD  Universal Protocol:  Consent: Verbal consent obtained. Written consent obtained. Risks and benefits: risks, benefits and alternatives were discussed  Consent given by: patient  Patient understanding: patient states understanding of the procedure being performed  Patient consent: the patient's understanding of the procedure matches consent given  Procedure consent: procedure consent matches procedure scheduled  Required items: required blood products, implants, devices, and special equipment available  Patient identity confirmed: verbally with patient      Indication:     Indications:  Other disorder of menstruation and other abnormal bleeding from female genital tract    Procedure:     Procedure: endometrial biopsy with Pipelle      A bivalve speculum was placed in the vagina: yes      Cervix cleaned and prepped: yes      Patient tolerated procedure well with no complications: no      Unable to perform due to: pain

## 2023-09-06 ENCOUNTER — TELEPHONE (OUTPATIENT)
Dept: OBGYN CLINIC | Facility: CLINIC | Age: 53
End: 2023-09-06

## 2023-09-06 NOTE — TELEPHONE ENCOUNTER
Called and spoke with pt. Pt did not see burrp! result message. Informed pt of her recent US results and Dr. Margaux Stoddard recommendation for Hysteroscopy D+C     Pt declines surgery scheduling  States her bleeding has stopped and has not returned since she last saw Dr. Carla Christina in the office. Pt states she will continue to monitor if the bleeding starts again and if so she will call to schedule D+C.

## 2023-09-06 NOTE — TELEPHONE ENCOUNTER
----- Message from Mary Hurtado [employer] sent at 9/6/2023 11:04 AM EDT -----  Regarding: needs surgery  Per Dr. Howard Kern, pt needs D+C

## 2023-10-30 ENCOUNTER — TELEPHONE (OUTPATIENT)
Age: 53
End: 2023-10-30

## 2023-10-30 DIAGNOSIS — R73.01 IMPAIRED FASTING GLUCOSE: ICD-10-CM

## 2023-10-30 DIAGNOSIS — Z13.0 SCREENING FOR DEFICIENCY ANEMIA: Primary | ICD-10-CM

## 2023-10-30 DIAGNOSIS — Z13.6 SCREENING FOR CARDIOVASCULAR CONDITION: ICD-10-CM

## 2023-10-30 NOTE — TELEPHONE ENCOUNTER
I ordered lab work for her for Juan Manuel Gaytan, but she has not been seen since April 2022.   Please ask her to schedule a physical  Thank you,  Porfirio Roy, DO

## 2023-10-30 NOTE — TELEPHONE ENCOUNTER
Pt called because she was at the eye doctor and her eye sight had decreased drastically. They suggested she get b/w to test for diabetes. Please, advise pt. Thank you.

## 2023-11-01 PROBLEM — E11.65 TYPE 2 DIABETES MELLITUS WITH HYPERGLYCEMIA, WITHOUT LONG-TERM CURRENT USE OF INSULIN (HCC): Status: ACTIVE | Noted: 2019-06-19

## 2023-11-01 LAB
ALBUMIN SERPL-MCNC: 4.3 G/DL (ref 3.8–4.9)
ALBUMIN/GLOB SERPL: 1.5 {RATIO} (ref 1.2–2.2)
ALP SERPL-CCNC: 132 IU/L (ref 44–121)
ALT SERPL-CCNC: 27 IU/L (ref 0–32)
AST SERPL-CCNC: 15 IU/L (ref 0–40)
BILIRUB SERPL-MCNC: 0.3 MG/DL (ref 0–1.2)
BUN SERPL-MCNC: 15 MG/DL (ref 6–24)
BUN/CREAT SERPL: 16 (ref 9–23)
CALCIUM SERPL-MCNC: 9.4 MG/DL (ref 8.7–10.2)
CHLORIDE SERPL-SCNC: 98 MMOL/L (ref 96–106)
CHOLEST SERPL-MCNC: 195 MG/DL (ref 100–199)
CO2 SERPL-SCNC: 24 MMOL/L (ref 20–29)
CREAT SERPL-MCNC: 0.92 MG/DL (ref 0.57–1)
EGFR: 75 ML/MIN/1.73
ERYTHROCYTE [DISTWIDTH] IN BLOOD BY AUTOMATED COUNT: 16 % (ref 11.7–15.4)
EST. AVERAGE GLUCOSE BLD GHB EST-MCNC: 280 MG/DL
GLOBULIN SER-MCNC: 2.9 G/DL (ref 1.5–4.5)
GLUCOSE SERPL-MCNC: 371 MG/DL (ref 70–99)
HBA1C MFR BLD: 11.4 % (ref 4.8–5.6)
HCT VFR BLD AUTO: 34.8 % (ref 34–46.6)
HDLC SERPL-MCNC: 31 MG/DL
HGB BLD-MCNC: 10.2 G/DL (ref 11.1–15.9)
LDLC SERPL CALC-MCNC: 132 MG/DL (ref 0–99)
LDLC/HDLC SERPL: 4.3 RATIO (ref 0–3.2)
MCH RBC QN AUTO: 21.1 PG (ref 26.6–33)
MCHC RBC AUTO-ENTMCNC: 29.3 G/DL (ref 31.5–35.7)
MCV RBC AUTO: 72 FL (ref 79–97)
PLATELET # BLD AUTO: 259 X10E3/UL (ref 150–450)
POTASSIUM SERPL-SCNC: 4.3 MMOL/L (ref 3.5–5.2)
PROT SERPL-MCNC: 7.2 G/DL (ref 6–8.5)
RBC # BLD AUTO: 4.84 X10E6/UL (ref 3.77–5.28)
SL AMB VLDL CHOLESTEROL CALC: 32 MG/DL (ref 5–40)
SODIUM SERPL-SCNC: 136 MMOL/L (ref 134–144)
TRIGL SERPL-MCNC: 179 MG/DL (ref 0–149)
WBC # BLD AUTO: 5.5 X10E3/UL (ref 3.4–10.8)

## 2023-11-02 ENCOUNTER — TELEPHONE (OUTPATIENT)
Age: 53
End: 2023-11-02

## 2023-11-02 ENCOUNTER — OFFICE VISIT (OUTPATIENT)
Dept: FAMILY MEDICINE CLINIC | Facility: CLINIC | Age: 53
End: 2023-11-02
Payer: COMMERCIAL

## 2023-11-02 ENCOUNTER — TELEPHONE (OUTPATIENT)
Dept: ADMINISTRATIVE | Facility: OTHER | Age: 53
End: 2023-11-02

## 2023-11-02 VITALS
SYSTOLIC BLOOD PRESSURE: 132 MMHG | RESPIRATION RATE: 14 BRPM | BODY MASS INDEX: 36.58 KG/M2 | HEIGHT: 62 IN | TEMPERATURE: 96.5 F | HEART RATE: 104 BPM | DIASTOLIC BLOOD PRESSURE: 68 MMHG | WEIGHT: 198.8 LBS | OXYGEN SATURATION: 99 %

## 2023-11-02 DIAGNOSIS — D50.9 IRON DEFICIENCY ANEMIA, UNSPECIFIED IRON DEFICIENCY ANEMIA TYPE: ICD-10-CM

## 2023-11-02 DIAGNOSIS — E11.65 TYPE 2 DIABETES MELLITUS WITH HYPERGLYCEMIA, WITHOUT LONG-TERM CURRENT USE OF INSULIN (HCC): Primary | ICD-10-CM

## 2023-11-02 PROCEDURE — 99214 OFFICE O/P EST MOD 30 MIN: CPT | Performed by: FAMILY MEDICINE

## 2023-11-02 RX ORDER — FERROUS SULFATE 324(65)MG
324 TABLET, DELAYED RELEASE (ENTERIC COATED) ORAL
Start: 2023-11-02

## 2023-11-02 RX ORDER — METFORMIN HYDROCHLORIDE 500 MG/1
1000 TABLET, EXTENDED RELEASE ORAL
Qty: 180 TABLET | Refills: 1 | Status: SHIPPED | OUTPATIENT
Start: 2023-11-02 | End: 2024-04-30

## 2023-11-02 NOTE — TELEPHONE ENCOUNTER
----- Message from Jean Malik DO sent at 11/2/2023 11:18 AM EDT -----  11/02/23 11:18 AM    Hello, our patient Kelton Velez has had Mammogram completed/performed. Please assist in updating the patient chart by making an External outreach to [facility] located in [facility]. The date of service is 2022.     Thank you,  Jean Malik DO  [facility]

## 2023-11-02 NOTE — PROGRESS NOTES
Name: Kelton Velez      : 1970      MRN: [id number]  Encounter Provider: Jean Malik DO  Encounter Date: 2023   Encounter department: [address] [facility] [facility]     1. Type 2 diabetes mellitus with hyperglycemia, without long-term current use of insulin ([facility])  Assessment & Plan:    Lab Results   Component Value Date    HGBA1C 11.4 (H) 10/31/2023     Newly diagnosed type 2 diabetes  Blood sugar is significantly uncontrolled. Long-term complications of diabetes discussed. She is very motivated to work on diet and exercise. Started on metformin  Refer to diabetic education  We also discussed that she would benefit from a statin due to her risk of heart disease but she would like time to work on diet first and starting metformin. She would like to avoid starting more than 1 medication at a time      Orders:  -     metFORMIN (GLUCOPHAGE-XR) 500 mg 24 hr tablet; Take 2 tablets (1,000 mg total) by mouth daily with breakfast  -     Ambulatory referral to Diabetic Education - use to refer for diabetes group classes, individual diabetes education, medical nutrition therapy, device training; Future; Expected date: 2023  -     Hemoglobin A1C; Future; Expected date: 01/15/2024  -     Comprehensive metabolic panel; Future; Expected date: 01/15/2024  -     Lipid Panel with Direct LDL reflex; Future; Expected date: 01/15/2024  -     Albumin / creatinine urine ratio; Future; Expected date: 01/15/2024  -     Hemoglobin A1C  -     Comprehensive metabolic panel  -     Lipid Panel with Direct LDL reflex  -     Albumin / creatinine urine ratio    2. Iron deficiency anemia, unspecified iron deficiency anemia type  Comments:  Secondary to abnormal periods. Had normal colonoscopy 2 years ago  We will start iron and get levels rechecked in 1 month  Orders:  -     ferrous sulfate 324 (65 Fe) mg; Take 1 tablet (324 mg total) by mouth daily before breakfast Take with vitamin C  -     CBC;  Future  - Iron; Future  -     TIBC Panel (incl. Iron, TIBC, % Iron Saturation); Future  -     Ferritin; Future  -     Basic metabolic panel; Future  -     CBC  -     Iron  -     Ferritin  -     Basic metabolic panel  -     CBC; Future; Expected date: 01/15/2024  -     Iron; Future; Expected date: 01/15/2024  -     TIBC Panel (incl. Iron, TIBC, % Iron Saturation); Future; Expected date: 01/15/2024  -     Ferritin; Future; Expected date: 01/15/2024  -     CBC  -     Iron  -     Ferritin    Return for Scheduled follow up in January. Depression Screening and Follow-up Plan: Patient was screened for depression during today's encounter. They screened negative with a PHQ-2 score of 0. Subjective      She had noticed that her vision has changed. She had very heavy menstrual bleeding in August.  She has not any periods since then. She is perimenopausal.    She could not stop eating ice 2 months ago and then started losing weight. Her  is gluten free and they are now eating a lot of rice, mostly white rice. She has a metal taste in her mouth. She was also eating a lot of candy. Review of Systems    Current Outpatient Medications on File Prior to Visit   Medication Sig   • mometasone 220 mcg/actuation inhaler Inhale 1 puff every evening Rinse mouth after use.    • [DISCONTINUED] Famotidine (PEPCID PO) Take by mouth as needed (Patient not taking: Reported on 11/2/2023)   • [DISCONTINUED] Fluticasone Propionate (Xhance) 93 MCG/ACT EXHU 1 spray by Each Nare route 2 (two) times a day (Patient not taking: Reported on 4/11/2022)   • [DISCONTINUED] ipratropium (ATROVENT) 0.03 % nasal spray ipratropium bromide 21 mcg (0.03 %) nasal spray (Patient not taking: No sig reported)   • [DISCONTINUED] ketoconazole (NIZORAL) 2 % cream Apply topically daily (Patient not taking: Reported on 8/24/2023)   • [DISCONTINUED] meloxicam (MOBIC) 15 mg tablet Take 1 tablet (15 mg total) by mouth daily (Patient not taking: Reported on 8/24/2023)   • [DISCONTINUED] Omeprazole Magnesium (PRILOSEC PO) Take by mouth as needed (Patient not taking: Reported on 11/2/2023)   • [DISCONTINUED] pantoprazole (PROTONIX) 20 mg tablet Take 1 tablet (20 mg total) by mouth daily (Patient not taking: Reported on 8/24/2023)       Objective     /68   Pulse 104   Temp (!) 96.5 °F (35.8 °C)   Resp 14   Ht 5' 2" (1.575 m)   Wt 90.2 kg (198 lb 12.8 oz)   LMP 09/14/2023 (Exact Date)   SpO2 99%   BMI 36.36 kg/m²     Physical Exam  Vitals and nursing note reviewed. Constitutional:       Appearance: She is well-developed. HENT:      Head: Normocephalic and atraumatic. Right Ear: Tympanic membrane and external ear normal.      Left Ear: Tympanic membrane and external ear normal.   Cardiovascular:      Rate and Rhythm: Normal rate and regular rhythm. Pulses: no weak pulses          Dorsalis pedis pulses are 2+ on the right side and 2+ on the left side. Posterior tibial pulses are 2+ on the right side and 2+ on the left side. Heart sounds: Normal heart sounds. No murmur heard. No friction rub. Pulmonary:      Effort: Pulmonary effort is normal. No respiratory distress. Breath sounds: Normal breath sounds. No wheezing or rales. Musculoskeletal:      Right lower leg: No edema. Left lower leg: No edema. Feet:      Right foot:      Skin integrity: No ulcer, skin breakdown, erythema, warmth, callus or dry skin. Left foot:      Skin integrity: No ulcer, skin breakdown, erythema, warmth, callus or dry skin. Patient's shoes and socks removed. Right Foot/Ankle   Right Foot Inspection  Skin Exam: skin normal. Skin not intact, no dry skin, no warmth, no callus, no erythema, no maceration, no abnormal color, no pre-ulcer, no ulcer and no callus. Toe Exam: ROM and strength within normal limits.      Sensory   Monofilament testing: intact    Vascular  Capillary refills: < 3 seconds  The right DP pulse is 2+. The right PT pulse is 2+. Left Foot/Ankle  Left Foot Inspection  Skin Exam: skin normal. Skin not intact, no dry skin, no warmth, no erythema, no maceration, normal color, no pre-ulcer, no ulcer and no callus. Toe Exam: ROM and strength within normal limits. Sensory   Monofilament testing: intact    Vascular  Capillary refills: < 3 seconds  The left DP pulse is 2+. The left PT pulse is 2+.      Assign Risk Category  No deformity present  No loss of protective sensation  No weak pulses  Risk: 0    Tin Fuchs,

## 2023-11-02 NOTE — TELEPHONE ENCOUNTER
Spoke with pt, she wanted to know if she bought the correct mg iron pills and she did. She also wanted to know how to research carbs and I told her the [employer] Is a good place to start.   HANNA Bond

## 2023-11-02 NOTE — TELEPHONE ENCOUNTER
Patient called in to clarify ferrous sulfate 324 (65 Fe) mg for patient to start taking post OV today. Patient reports PCP referred to OTC iron supplement. PCP put order in for supplement order to print in office. Please clarify with PCP if order was to be sent to [address] [place] [address] or for patient to  correct dose OTC. Thank you.

## 2023-11-02 NOTE — LETTER
Procedure Request Form: Mammogram      Date Requested: 23  Patient: Pa Ports  Patient : 1970   Referring Provider: General Alba, DO        Date of Procedure ______________________________       The above patient has informed us that they have completed their   most recent Mammogram at your facility. Please complete   this form and attach all corresponding procedure reports/results. Comments __________________________________________________________  ____________________________________________________________________  ____________________________________________________________________  ____________________________________________________________________    Facility Completing Procedure _________________________________________    Form Completed By (print name) _______________________________________      Signature __________________________________________________________      These reports are needed for  compliance. Please fax this completed form and a copy of the procedure report to our office located at [address] [place] [address] as soon as possible to Fax [phone] attention Cintia Huerta: Phone [phone]    We thank you for your assistance in treating our mutual patient.     [address] [phone]

## 2023-11-02 NOTE — ASSESSMENT & PLAN NOTE
Lab Results   Component Value Date    HGBA1C 11.4 (H) 10/31/2023     Newly diagnosed type 2 diabetes  Blood sugar is significantly uncontrolled. Long-term complications of diabetes discussed. She is very motivated to work on diet and exercise. Started on metformin  Refer to diabetic education  We also discussed that she would benefit from a statin due to her risk of heart disease but she would like time to work on diet first and starting metformin.   She would like to avoid starting more than 1 medication at a time

## 2023-11-06 ENCOUNTER — TELEPHONE (OUTPATIENT)
Age: 53
End: 2023-11-06

## 2023-11-06 NOTE — TELEPHONE ENCOUNTER
Upon review of the In Basket request and the patient's chart, initial outreach has been made via fax to facility. Please see Contacts section for details.      Thank you  Julita Luis

## 2023-11-06 NOTE — TELEPHONE ENCOUNTER
Post OV 11/2 and meeting with dietician, patient called into to follow up with provider to inform she is following all recommendations, but reports she is not feeling well  Dizzy and lightheaded for the past two days; visual changes; can't use eyeglasses for computer use; close up is blurry. Denies room is spinning; No medication changes. Feels like she is rocking on boat. Please follow up with patient.

## 2023-11-06 NOTE — TELEPHONE ENCOUNTER
Please ask her to come in for an appointment to be reevaluated and to get a sugar level checked  Thank you,  Justin Loera, DO

## 2023-11-08 ENCOUNTER — TELEPHONE (OUTPATIENT)
Age: 53
End: 2023-11-08

## 2023-11-08 DIAGNOSIS — E11.65 TYPE 2 DIABETES MELLITUS WITH HYPERGLYCEMIA, WITHOUT LONG-TERM CURRENT USE OF INSULIN (HCC): Primary | ICD-10-CM

## 2023-11-08 NOTE — TELEPHONE ENCOUNTER
Patient called and stated that she was diagnosed with diabetes about a week ago. She states that her number are all off and would like to see an Endocrinologist who specializes in this. Patient is wondering if Dr. Jose Angel Landis can place an order in her chart for this. She would like just a generic order as she needs to check with her insurance company on who is in her plan. Does not want to see [facility].     Please call patient when order is ready

## 2023-11-09 NOTE — TELEPHONE ENCOUNTER
Patient would like to see a specialist for diabetes . 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  [address] [facility], [address] [facility]

## 2023-11-09 NOTE — TELEPHONE ENCOUNTER
Upon review of the In Basket request the most recent mammogram is updated in HM. Any additional questions or concerns should be emailed to the Practice Liaisons via the appropriate education email address, please do not reply via In Basket.     Thank you  Mary Hansen

## 2023-12-02 LAB
BUN SERPL-MCNC: 14 MG/DL (ref 6–24)
BUN/CREAT SERPL: 16 (ref 9–23)
CALCIUM SERPL-MCNC: 9.6 MG/DL (ref 8.7–10.2)
CHLORIDE SERPL-SCNC: 103 MMOL/L (ref 96–106)
CO2 SERPL-SCNC: 23 MMOL/L (ref 20–29)
CREAT SERPL-MCNC: 0.86 MG/DL (ref 0.57–1)
EGFR: 81 ML/MIN/1.73
ERYTHROCYTE [DISTWIDTH] IN BLOOD BY AUTOMATED COUNT: 23.7 % (ref 11.7–15.4)
FERRITIN SERPL-MCNC: 23 NG/ML (ref 15–150)
GLUCOSE SERPL-MCNC: 140 MG/DL (ref 70–99)
HCT VFR BLD AUTO: 37.4 % (ref 34–46.6)
HGB BLD-MCNC: 11.6 G/DL (ref 11.1–15.9)
IRON SERPL-MCNC: 32 UG/DL (ref 27–159)
MCH RBC QN AUTO: 23.5 PG (ref 26.6–33)
MCHC RBC AUTO-ENTMCNC: 31 G/DL (ref 31.5–35.7)
MCV RBC AUTO: 76 FL (ref 79–97)
PLATELET # BLD AUTO: 194 X10E3/UL (ref 150–450)
POTASSIUM SERPL-SCNC: 4 MMOL/L (ref 3.5–5.2)
RBC # BLD AUTO: 4.94 X10E6/UL (ref 3.77–5.28)
SODIUM SERPL-SCNC: 140 MMOL/L (ref 134–144)
WBC # BLD AUTO: 4.3 X10E3/UL (ref 3.4–10.8)

## 2023-12-05 ENCOUNTER — TELEPHONE (OUTPATIENT)
Age: 53
End: 2023-12-05

## 2023-12-05 DIAGNOSIS — D50.9 IRON DEFICIENCY ANEMIA, UNSPECIFIED IRON DEFICIENCY ANEMIA TYPE: Primary | ICD-10-CM

## 2023-12-05 NOTE — TELEPHONE ENCOUNTER
12/5/2023 12:43 PM returned call to Manan Hatch     She reports that she is starting to feel better. She also has not had a period in 3 months.     Will have her keep taking iron daily    Will recheck labs before her appointment in January    Message complete  Starla Trujillo, DO

## 2023-12-05 NOTE — TELEPHONE ENCOUNTER
Pt was calling to go over lab results was transferred to [address] pod to go over those labs.

## 2023-12-05 NOTE — TELEPHONE ENCOUNTER
Patient called in to review results. Results reviewed. Patient has questions about CBC results for Dr. Renetta Aguilar. Patient request to speak with Dr Renetta Aguilar directly. Please have Dr Renetta Agiular call patient back at [phone].

## 2024-02-02 ENCOUNTER — TELEPHONE (OUTPATIENT)
Dept: FAMILY MEDICINE CLINIC | Facility: CLINIC | Age: 54
End: 2024-02-02

## 2024-02-02 LAB
ALBUMIN SERPL-MCNC: 4.4 G/DL (ref 3.8–4.9)
ALBUMIN/CREAT UR: 9 MG/G CREAT (ref 0–29)
ALBUMIN/GLOB SERPL: 1.8 {RATIO} (ref 1.2–2.2)
ALP SERPL-CCNC: 81 IU/L (ref 44–121)
ALT SERPL-CCNC: 15 IU/L (ref 0–32)
AST SERPL-CCNC: 15 IU/L (ref 0–40)
BILIRUB SERPL-MCNC: 0.3 MG/DL (ref 0–1.2)
BUN SERPL-MCNC: 19 MG/DL (ref 6–24)
BUN/CREAT SERPL: 23 (ref 9–23)
CALCIUM SERPL-MCNC: 9.6 MG/DL (ref 8.7–10.2)
CHLORIDE SERPL-SCNC: 102 MMOL/L (ref 96–106)
CHOLEST SERPL-MCNC: 205 MG/DL (ref 100–199)
CO2 SERPL-SCNC: 25 MMOL/L (ref 20–29)
CREAT SERPL-MCNC: 0.84 MG/DL (ref 0.57–1)
CREAT UR-MCNC: 149.9 MG/DL
EGFR: 84 ML/MIN/1.73
ERYTHROCYTE [DISTWIDTH] IN BLOOD BY AUTOMATED COUNT: 16.5 % (ref 11.7–15.4)
EST. AVERAGE GLUCOSE BLD GHB EST-MCNC: 143 MG/DL
FERRITIN SERPL-MCNC: 44 NG/ML (ref 15–150)
GLOBULIN SER-MCNC: 2.4 G/DL (ref 1.5–4.5)
GLUCOSE SERPL-MCNC: 144 MG/DL (ref 70–99)
HBA1C MFR BLD: 6.6 % (ref 4.8–5.6)
HCT VFR BLD AUTO: 41.5 % (ref 34–46.6)
HDLC SERPL-MCNC: 35 MG/DL
HGB BLD-MCNC: 13.8 G/DL (ref 11.1–15.9)
IRON SERPL-MCNC: 67 UG/DL (ref 27–159)
LDLC SERPL CALC-MCNC: 144 MG/DL (ref 0–99)
LDLC/HDLC SERPL: 4.1 RATIO (ref 0–3.2)
MCH RBC QN AUTO: 27.3 PG (ref 26.6–33)
MCHC RBC AUTO-ENTMCNC: 33.3 G/DL (ref 31.5–35.7)
MCV RBC AUTO: 82 FL (ref 79–97)
MICROALBUMIN UR-MCNC: 13.3 UG/ML
MICRODELETION SYND BLD/T FISH: NORMAL
PLATELET # BLD AUTO: 170 X10E3/UL (ref 150–450)
POTASSIUM SERPL-SCNC: 4.1 MMOL/L (ref 3.5–5.2)
PROT SERPL-MCNC: 6.8 G/DL (ref 6–8.5)
RBC # BLD AUTO: 5.05 X10E6/UL (ref 3.77–5.28)
SL AMB VLDL CHOLESTEROL CALC: 26 MG/DL (ref 5–40)
SODIUM SERPL-SCNC: 141 MMOL/L (ref 134–144)
TRIGL SERPL-MCNC: 143 MG/DL (ref 0–149)
WBC # BLD AUTO: 5.4 X10E3/UL (ref 3.4–10.8)

## 2024-02-02 NOTE — TELEPHONE ENCOUNTER
Reviewed pt's labs, she needs to schedule a follow up visit with Dr. Morrison to review them.  Looks like she cancelled her January appt.  Thanks!

## 2024-02-21 PROBLEM — J33.9 SAMTER'S TRIAD: Status: RESOLVED | Noted: 2019-10-22 | Resolved: 2024-02-21

## 2024-02-21 PROBLEM — Z88.6 SAMTER'S TRIAD: Status: RESOLVED | Noted: 2019-10-22 | Resolved: 2024-02-21

## 2024-02-21 PROBLEM — J45.909 SAMTER'S TRIAD: Status: RESOLVED | Noted: 2019-10-22 | Resolved: 2024-02-21

## 2024-02-23 ENCOUNTER — OFFICE VISIT (OUTPATIENT)
Dept: FAMILY MEDICINE CLINIC | Facility: CLINIC | Age: 54
End: 2024-02-23
Payer: COMMERCIAL

## 2024-02-23 VITALS
TEMPERATURE: 97.3 F | SYSTOLIC BLOOD PRESSURE: 124 MMHG | WEIGHT: 171.4 LBS | BODY MASS INDEX: 31.54 KG/M2 | HEART RATE: 71 BPM | RESPIRATION RATE: 18 BRPM | DIASTOLIC BLOOD PRESSURE: 72 MMHG | HEIGHT: 62 IN

## 2024-02-23 DIAGNOSIS — E78.2 MIXED HYPERLIPIDEMIA: ICD-10-CM

## 2024-02-23 DIAGNOSIS — E11.9 TYPE 2 DIABETES MELLITUS WITHOUT COMPLICATION, WITHOUT LONG-TERM CURRENT USE OF INSULIN (HCC): Primary | ICD-10-CM

## 2024-02-23 DIAGNOSIS — D50.9 IRON DEFICIENCY ANEMIA, UNSPECIFIED IRON DEFICIENCY ANEMIA TYPE: ICD-10-CM

## 2024-02-23 PROCEDURE — 99214 OFFICE O/P EST MOD 30 MIN: CPT | Performed by: FAMILY MEDICINE

## 2024-02-23 NOTE — PROGRESS NOTES
Name: Marilynn Almaraz      : 1970      MRN: [id number]  Encounter Provider: Rebekah Morrison DO  Encounter Date: 2024   Encounter department: [facility]    Assessment & Plan   1. Type 2 diabetes mellitus without complication, without long-term current use of insulin ([facility])  Assessment & Plan:    Lab Results   Component Value Date    HGBA1C 6.6 (H) 2024     Improved significantly with diet     Orders:  -     Hemoglobin A1C; Future; Expected date: 2024  -     Hemoglobin A1C    2. Iron deficiency anemia, unspecified iron deficiency anemia type  -     CBC; Future; Expected date: 2024  -     Ferritin; Future; Expected date: 2024  -     Iron; Future; Expected date: 2024  -     CBC  -     Ferritin  -     Iron    3. Mixed hyperlipidemia  -     Comprehensive metabolic panel; Future; Expected date: 2024  -     Lipid Panel with Direct LDL reflex; Future; Expected date: 2024  -     Comprehensive metabolic panel  -     Lipid Panel with Direct LDL reflex       Assessment & Plan  1. Diabetes.  Her A1c is at goal. She was advised to make an appointment with her eye doctor.  Form given    2. Iron deficiency.  Her iron level is better. She was advised to take iron twice a week. I will order blood work for 6 months including A1c, cholesterol, blood count, and iron.    3. Elevated cholesterol.  Her cholesterol is still high. Her LDL is 144. We will give it another 6 months and see how the diet settles.  Currently because of her diabetes she is at increased risk of heart disease    Follow-up  The patient will follow up in 6 months.     Orders Placed This Encounter   Procedures    CBC    Comprehensive metabolic panel    Hemoglobin A1C    Lipid Panel with Direct LDL reflex    Ferritin    Iron       Subjective      History of Present Illness  The patient presents to the office for follow-up of multiple medical concerns.    She has lost 33 pounds from when she first started  "with lifestyle changes. She did take 1 metformin and then went to an endocrinologist who had her take 1 metformin in the morning and 1 at night and then had her take 1 insulin pill and wanted her to take an injection, which she declined. The endocrinologist was scared about her eyesight and told her to get her sugar down and that it could damage her eyesight. She did that for 2 weeks and then after the 2 weeks, she stopped the metformin. She was testing her blood every 2 hours. Her fingers hurt really bad. She saw the endocrinologist 2 weeks ago. She wanted her to take 1 metformin at night because when she wakes up, her sugar is high. She saw a dietitian at [facility]. She is starting to get sick of the things she is eating. The endocrinologist wanted her to keep her sugar under 130. She joined a lot of [employer] groups. She switched to eating every 2 hours. When she goes to sleep, her blood sugar is in the 40s and 50s. When she wakes up, it is 137. She exercises every day. She does not want to be on medicine for the rest of her life. She checks her blood sugar every 2 hours after she tries something new. Her eyesight went back to normal.    She could not go to the bathroom with the iron. She was taking stool softeners and fiber. She was taking it every day.    She does not want to take any pills for her cholesterol. Her cholesterol was 205. She is not eating carbs. She eats cheese.   The patient is allergic to aspartame  Review of Systems      Objective     /72   Pulse 71   Temp (!) 97.3 °F (36.3 °C)   Resp 18   Ht 5' 2\" (1.575 m)   Wt 77.7 kg (171 lb 6.4 oz)   BMI 31.35 kg/m²     Physical Exam    Physical Exam  Vitals and nursing note reviewed.   Constitutional:       Appearance: She is well-developed.   HENT:      Head: Normocephalic and atraumatic.      Right Ear: Tympanic membrane and external ear normal.      Left Ear: Tympanic membrane and external ear normal.   Cardiovascular:      Rate and Rhythm: " Normal rate and regular rhythm.      Heart sounds: Normal heart sounds. No murmur heard.     No friction rub.   Pulmonary:      Effort: Pulmonary effort is normal. No respiratory distress.      Breath sounds: Normal breath sounds. No wheezing or rales.   Musculoskeletal:      Right lower leg: No edema.      Left lower leg: No edema.

## 2024-02-23 NOTE — ASSESSMENT & PLAN NOTE
Lab Results   Component Value Date    HGBA1C 6.6 (H) 02/01/2024     Improved significantly with diet

## 2024-02-26 ENCOUNTER — TELEPHONE (OUTPATIENT)
Dept: ADMINISTRATIVE | Facility: OTHER | Age: 54
End: 2024-02-26

## 2024-02-26 NOTE — LETTER
Procedure Request Form: Mammogram       Date Requested: 24  Patient: Marilynn Weaver Sung  Patient : 1970   Referring Provider: Rebekah Morrison, DO        Date of Procedure ______________________________       The above patient has informed us that they have completed their   most recent Mammogram at your facility. Please complete   this form and attach all corresponding procedure reports/results.    Comments __________________________________________________________  ____________________________________________________________________  ____________________________________________________________________  ____________________________________________________________________    Facility Completing Procedure _________________________________________    Form Completed By (print name) _______________________________________      Signature __________________________________________________________      These reports are needed for  compliance.    Please fax this completed form and a copy of the procedure report to our office located at [address] [place] [address] as soon as possible to Fax [phone] leana Artis: Phone [phone]    We thank you for your assistance in treating our mutual patient.

## 2024-02-26 NOTE — TELEPHONE ENCOUNTER
----- Message from Rebekah Morrison DO sent at 2/23/2024  4:25 PM EST -----  02/23/24 4:25 PM    Hello, our patient Marilynn Almaraz has had Mammogram completed/performed. Please assist in updating the patient chart by making an External outreach to [facility] located in [place], [place]. The date of service is 2023.    Thank you,  Rebekah Morrison DO  [facility]

## 2024-02-27 NOTE — TELEPHONE ENCOUNTER
Upon review of the In Basket request and the patient's chart, initial outreach has been made via fax to facility. Please see Contacts section for details.     Thank you  Steff Cruz

## 2024-02-27 NOTE — TELEPHONE ENCOUNTER
Upon review of the In Basket request we have found as a result of outreach that patient did not have the requested item(s) completed.  [facility] states 2018 was last Mammogram with them. Patient needs a new script.    Any additional questions or concerns should be emailed to the Practice Liaisons via the appropriate education email address, please do not reply via In Basket.    Thank you  Steff Cruz

## 2024-03-06 DIAGNOSIS — Z12.31 ENCOUNTER FOR SCREENING MAMMOGRAM FOR BREAST CANCER: Primary | ICD-10-CM

## 2024-05-06 LAB
LEFT EYE DIABETIC RETINOPATHY: NORMAL
RIGHT EYE DIABETIC RETINOPATHY: NORMAL

## 2024-05-22 NOTE — ASSESSMENT & PLAN NOTE
Discussed LADAN and mateo's triad impact on asthma, allergies, and nasal polyposis
Grade III nasal polyps noted on nasal speculum exam   Reviewed Ct scan head from 02/2019 indicating pansinusitis and nasal polyposis and prior limited surgical interventions  Recommend using saline irrigation and nasal steroids on daily basis for up to at least three months to see improvement  Reviewed oral steroids taper  Reviewed possible allergy involvement and follow up with allergist   Reviewed Alka Gutierrez, mometasone rinses, and Dupixent  Reviewed clinical trial options  Discussed surgical options if needed  Recommend revision FESS with image guidance  Reviewed risk of reoccurrence of nasal polyps due to AERD  Recommend repeat Ct scan sinus with image guidance prior to proceed with surgery  Treat with oral steroids prior to CT scan  Reviewed her concerns with time out of work due to surgery, approx one week out  Pt may defer surgery until summer months  Agreed consider clinical trial options in meantime  Agreed to consider options    Follow up in few weeks to discuss further
Grade III nasal polyps visible with nasal speculum 
None

## 2024-11-19 ENCOUNTER — TELEPHONE (OUTPATIENT)
Dept: FAMILY MEDICINE CLINIC | Facility: CLINIC | Age: 54
End: 2024-11-19

## 2024-11-19 ENCOUNTER — TELEPHONE (OUTPATIENT)
Age: 54
End: 2024-11-19

## 2024-11-19 ENCOUNTER — OFFICE VISIT (OUTPATIENT)
Dept: FAMILY MEDICINE CLINIC | Facility: CLINIC | Age: 54
End: 2024-11-19
Payer: COMMERCIAL

## 2024-11-19 VITALS
RESPIRATION RATE: 16 BRPM | SYSTOLIC BLOOD PRESSURE: 116 MMHG | TEMPERATURE: 97.3 F | HEART RATE: 75 BPM | BODY MASS INDEX: 31.83 KG/M2 | OXYGEN SATURATION: 98 % | HEIGHT: 62 IN | DIASTOLIC BLOOD PRESSURE: 70 MMHG | WEIGHT: 173 LBS

## 2024-11-19 DIAGNOSIS — R73.03 PREDIABETES: Primary | ICD-10-CM

## 2024-11-19 DIAGNOSIS — R10.32 LEFT GROIN PAIN: ICD-10-CM

## 2024-11-19 DIAGNOSIS — Z11.59 NEED FOR HEPATITIS C SCREENING TEST: ICD-10-CM

## 2024-11-19 DIAGNOSIS — E78.2 MIXED HYPERLIPIDEMIA: ICD-10-CM

## 2024-11-19 DIAGNOSIS — Z12.31 SCREENING MAMMOGRAM, ENCOUNTER FOR: ICD-10-CM

## 2024-11-19 DIAGNOSIS — D50.9 IRON DEFICIENCY ANEMIA, UNSPECIFIED IRON DEFICIENCY ANEMIA TYPE: ICD-10-CM

## 2024-11-19 DIAGNOSIS — R22.32 MASS OF FINGER OF LEFT HAND: ICD-10-CM

## 2024-11-19 PROCEDURE — 99214 OFFICE O/P EST MOD 30 MIN: CPT | Performed by: FAMILY MEDICINE

## 2024-11-19 NOTE — TELEPHONE ENCOUNTER
Patient dropped off Diabetic Eye Exam form that she meant to give Dr Morrison during her appt today 11/19/24. Placed form in Dr Morrison's folder

## 2024-11-19 NOTE — TELEPHONE ENCOUNTER
Caller: Marilynn Almaraz    Doctor: Nikky     Reason for call: Marilynn Weaver is wondering when the last time she ordered orthotics was.  Do we have any way of giving her that information? Can someone reach out to her?  Thank you.     Call back#: [phone]

## 2024-11-19 NOTE — ASSESSMENT & PLAN NOTE
She has significantly changed her lifestyle  Labs ordered  Orders:    Comprehensive metabolic panel; Future    Lipid Panel with Direct LDL reflex; Future

## 2024-11-19 NOTE — ASSESSMENT & PLAN NOTE
Last hemoglobin A1c was 6.3 per patient  Follow-up labs ordered  Lab Results   Component Value Date    HGBA1C 6.6 (H) 02/01/2024       Orders:    Hemoglobin A1C; Future

## 2024-11-25 ENCOUNTER — VBI (OUTPATIENT)
Dept: ADMINISTRATIVE | Facility: OTHER | Age: 54
End: 2024-11-25

## 2024-11-25 NOTE — TELEPHONE ENCOUNTER
Upon review of the In Basket request and the patient's chart, initial outreach has been made via telephone call to facility. Please see Contacts section for details.     Thank you  Prieto Breaux MA

## 2024-11-25 NOTE — TELEPHONE ENCOUNTER
Upon review of the In Basket request we were able to locate, review, and update the patient chart as requested for Diabetic Eye Exam.    Any additional questions or concerns should be emailed to the Practice Liaisons via the appropriate education email address, please do not reply via In Basket.    Thank you  Prieto Breaux MA   [facility]

## 2024-11-25 NOTE — TELEPHONE ENCOUNTER
As a follow-up, a second attempt has been made for outreach via telephone call to facility. Please see Contacts section for details.    Thank you  Prieto Breaux MA

## 2024-11-26 ENCOUNTER — OFFICE VISIT (OUTPATIENT)
Age: 54
End: 2024-11-26
Payer: COMMERCIAL

## 2024-11-26 VITALS
BODY MASS INDEX: 31.83 KG/M2 | DIASTOLIC BLOOD PRESSURE: 79 MMHG | HEIGHT: 62 IN | WEIGHT: 173 LBS | RESPIRATION RATE: 17 BRPM | SYSTOLIC BLOOD PRESSURE: 139 MMHG | HEART RATE: 67 BPM

## 2024-11-26 DIAGNOSIS — M77.41 METATARSALGIA OF BOTH FEET: Primary | ICD-10-CM

## 2024-11-26 DIAGNOSIS — M77.42 METATARSALGIA OF BOTH FEET: Primary | ICD-10-CM

## 2024-11-26 DIAGNOSIS — M21.41 ACQUIRED FLAT FOOT, RIGHT: ICD-10-CM

## 2024-11-26 DIAGNOSIS — M21.961 ACQUIRED DEFORMITY OF BOTH FEET: ICD-10-CM

## 2024-11-26 DIAGNOSIS — M21.962 ACQUIRED DEFORMITY OF BOTH FEET: ICD-10-CM

## 2024-11-26 DIAGNOSIS — M21.42 ACQUIRED FLAT FOOT, LEFT: ICD-10-CM

## 2024-11-26 PROCEDURE — 99213 OFFICE O/P EST LOW 20 MIN: CPT | Performed by: PODIATRIST

## 2024-11-26 NOTE — PROGRESS NOTES
Assessment/Plan: Metatarsalgia secondary to acquired deformity foot bilateral.  Pain upon ambulation.  Acquired pes planus bilateral.    Plan.  Chart reviewed.  PCP notes reviewed.  Patient evaluated.  At this time I believe patient would benefit from orthotic foot control.  Her feet of been casted for these.  She will use these daily to control deformity and ease pain.  Aftercare instruction given.  Return as needed.         Diagnoses and all orders for this visit:    Metatarsalgia of both feet  -     Device Prior Authorization; Future    Acquired deformity of both feet  -     Device Prior Authorization; Future    Acquired flat foot, right  -     Device Prior Authorization; Future    Acquired flat foot, left  -     Device Prior Authorization; Future          Subjective: Patient gets pain.  She has pain in her feet with ambulation and shoewear.  No history of trauma.  Patient is prediabetic.  Patient has used orthotics in the past.  She would like a new pair.    Allergies   Allergen Reactions    Aspirin Allergic Rhinitis    Citric Acid Anhydrous [Citric Acid - Food Allergy] Allergic Rhinitis    Corn-Containing Products - Food Allergy Allergic Rhinitis    Ibuprofen Allergic Rhinitis    Isoflavones (Soy) Allergic Rhinitis    Naproxen Allergic Rhinitis    Citrus - Food Allergy      Sneezing attack    Other Itching     Seasonal / animals       No current outpatient medications on file.    Patient Active Problem List   Diagnosis    Allergic rhinitis    Apnea    Neck pain    Congenital pes planus of left foot    Congenital pes planus of right foot    Esophageal reflux    Hemorrhoids    Lichen sclerosus et atrophicus    Obesity    Plantar fasciitis    Pain in both feet    Acquired deformity of left foot    Left foot pain    Nontraumatic tear of plantar fascia    Anal fistula    Anal fissure    Dense breast    Prediabetes    Nasal polyps    Chronic sinusitis    Hyposmia    Post-nasal drip    Mixed hyperlipidemia           Patient ID: Marilynn Almaraz is a 54 y.o. female.    HPI    The following portions of the patient's history were reviewed and updated as appropriate:     family history includes Brain cancer in her father; Depression in her mother; Diabetes in her paternal grandfather and paternal grandmother; Hypertension in her mother; No Known Problems in her maternal aunt and maternal aunt.      reports that she quit smoking about 22 years ago. Her smoking use included cigarettes. She has never been exposed to tobacco smoke. She has never used smokeless tobacco. She reports current alcohol use. She reports that she does not use drugs.    Vitals:    11/26/24 1602   BP: 139/79   Pulse: 67   Resp: 17       Review of Systems      Objective:  Patient's shoes and socks removed.   Foot ExamPhysical Exam        General  General Appearance: appears stated age and healthy   Orientation: alert and oriented to person, place, and time   Affect: appropriate   Gait: antalgic         Right Foot/Ankle      Inspection and Palpation  Ecchymosis: none  Tenderness: none   Swelling: metatarsals   Arch: pes planus  Hammertoes: fifth toe  Hallux valgus: no  Hallux limitus: yes  Skin Exam: dry skin;      Neurovascular  Dorsalis pedis: 2+  Posterior tibial: 3+  Saphenous nerve sensation: normal  Tibial nerve sensation: normal  Superficial peroneal nerve sensation: normal  Deep peroneal nerve sensation: normal  Sural nerve sensation: normal  Achilles reflex: 2+  Babinski reflex: 2+        Left Foot/Ankle       Inspection and Palpation  Ecchymosis: none  Tenderness: bony tenderness, plantar fascia and calcaneus tenderness   Swelling: metatarsals and plantar fascia   Arch: pes planus  Hammertoes: fifth toe  Hallux valgus: no  Hallux limitus: yes  Skin Exam: dry skin;      Neurovascular  Dorsalis pedis: 2+  Posterior tibial: 3+  Saphenous nerve sensation: normal  Tibial nerve sensation: normal  Superficial peroneal nerve sensation: normal  Deep peroneal nerve  sensation: normal  Sural nerve sensation: normal  Achilles reflex: 2+  Babinski reflex: 2+           Physical Exam   Constitutional: She is oriented to person, place, and time. She appears well-developed and well-nourished.   Cardiovascular: Normal rate and regular rhythm.   Pulses:       Dorsalis pedis pulses are 2+ on the right side, and 2+ on the left side.        Posterior tibial pulses are 3+ on the right side, and 3+ on the left side.   Musculoskeletal:        Left foot: There is bony tenderness.   Patient is maximally pronated in stance and gait.  There is pain with palpation left plantar fascia insertion.  Negative Tinel sign.  Negative pain with tuning fork test of left heel.    X-ray demonstrates plantar calcaneal spurring bilateral.         Feet:   Right Foot:   Skin Integrity: Positive for dry skin.  Patient demonstrates 2 skin lesions consistent with atypical verruca posterior plantar aspect right heel.  In addition all nails are dystrophic.  They have superficial mycosis     Left Foot:   Skin Integrity: Positive for dry skin.    Neurological: She is alert and oriented to person, place, and time.   Reflex Scores:       Achilles reflexes are 2+ on the right side and 2+ on the left side.  Skin: Skin is warm and dry. Capillary refill takes less than 2 seconds.   Vitals reviewed.

## 2024-12-11 ENCOUNTER — RESULTS FOLLOW-UP (OUTPATIENT)
Dept: FAMILY MEDICINE CLINIC | Facility: CLINIC | Age: 54
End: 2024-12-11

## 2024-12-11 LAB
ALBUMIN SERPL-MCNC: 4.3 G/DL (ref 3.8–4.9)
ALP SERPL-CCNC: 91 IU/L (ref 44–121)
ALT SERPL-CCNC: 23 IU/L (ref 0–32)
AST SERPL-CCNC: 17 IU/L (ref 0–40)
BILIRUB SERPL-MCNC: 0.4 MG/DL (ref 0–1.2)
BUN SERPL-MCNC: 17 MG/DL (ref 6–24)
BUN/CREAT SERPL: 23 (ref 9–23)
CALCIUM SERPL-MCNC: 9.4 MG/DL (ref 8.7–10.2)
CHLORIDE SERPL-SCNC: 102 MMOL/L (ref 96–106)
CHOLEST SERPL-MCNC: 204 MG/DL (ref 100–199)
CO2 SERPL-SCNC: 25 MMOL/L (ref 20–29)
CREAT SERPL-MCNC: 0.75 MG/DL (ref 0.57–1)
EGFR: 95 ML/MIN/1.73
ERYTHROCYTE [DISTWIDTH] IN BLOOD BY AUTOMATED COUNT: 12.1 % (ref 11.7–15.4)
EST. AVERAGE GLUCOSE BLD GHB EST-MCNC: 128 MG/DL
FERRITIN SERPL-MCNC: 83 NG/ML (ref 15–150)
GLOBULIN SER-MCNC: 2.4 G/DL (ref 1.5–4.5)
GLUCOSE SERPL-MCNC: 125 MG/DL (ref 70–99)
HBA1C MFR BLD: 6.1 % (ref 4.8–5.6)
HCT VFR BLD AUTO: 44 % (ref 34–46.6)
HCV AB S/CO SERPL IA: NON REACTIVE
HDLC SERPL-MCNC: 42 MG/DL
HGB BLD-MCNC: 14.4 G/DL (ref 11.1–15.9)
IRON SERPL-MCNC: 82 UG/DL (ref 27–159)
LDLC SERPL CALC-MCNC: 145 MG/DL (ref 0–99)
LDLC/HDLC SERPL: 3.5 RATIO (ref 0–3.2)
MCH RBC QN AUTO: 28.9 PG (ref 26.6–33)
MCHC RBC AUTO-ENTMCNC: 32.7 G/DL (ref 31.5–35.7)
MCV RBC AUTO: 88 FL (ref 79–97)
MICRODELETION SYND BLD/T FISH: NORMAL
PLATELET # BLD AUTO: 165 X10E3/UL (ref 150–450)
POTASSIUM SERPL-SCNC: 4.4 MMOL/L (ref 3.5–5.2)
PROT SERPL-MCNC: 6.7 G/DL (ref 6–8.5)
RBC # BLD AUTO: 4.99 X10E6/UL (ref 3.77–5.28)
SL AMB VLDL CHOLESTEROL CALC: 17 MG/DL (ref 5–40)
SODIUM SERPL-SCNC: 140 MMOL/L (ref 134–144)
TRIGL SERPL-MCNC: 91 MG/DL (ref 0–149)
WBC # BLD AUTO: 5.1 X10E3/UL (ref 3.4–10.8)

## 2024-12-12 ENCOUNTER — TELEPHONE (OUTPATIENT)
Age: 54
End: 2024-12-12

## 2024-12-12 NOTE — TELEPHONE ENCOUNTER
Caller: [employer]    Doctor: Nikky     Reason for call: requests for orthotics , wanting to advise patient has coverage for orthotics 50% and pre certification would be required please contact insurance to go over all needs required for this request please     Please advise     Call back#: [phone]

## 2024-12-13 ENCOUNTER — RESULTS FOLLOW-UP (OUTPATIENT)
Dept: FAMILY MEDICINE CLINIC | Facility: CLINIC | Age: 54
End: 2024-12-13

## 2024-12-17 DIAGNOSIS — R10.32 LEFT GROIN PAIN: Primary | ICD-10-CM

## 2024-12-24 DIAGNOSIS — Z12.31 SCREENING MAMMOGRAM, ENCOUNTER FOR: ICD-10-CM

## 2025-05-28 NOTE — PROGRESS NOTES
"Name: Marilynn Almaraz      : 1970      MRN: [id number]  Encounter Provider: Rebekah Morrison DO  Encounter Date: 2024   Encounter department: [facility]  :  Assessment & Plan  Prediabetes  Last hemoglobin A1c was 6.3 per patient  Follow-up labs ordered  Lab Results   Component Value Date    HGBA1C 6.6 (H) 2024       Orders:    Hemoglobin A1C; Future    Screening mammogram, encounter for    Orders:    Mammo screening bilateral w 3d and cad; Future    Mixed hyperlipidemia  She has significantly changed her lifestyle  Labs ordered  Orders:    Comprehensive metabolic panel; Future    Lipid Panel with Direct LDL reflex; Future    Left groin pain  New  Orders:    XR hip/pelv 4+ vw left if performed; Future    Iron deficiency anemia, unspecified iron deficiency anemia type  Only taking iron occasionally  Will check labs  Orders:    CBC; Future    Ferritin; Future    Iron; Future    Need for hepatitis C screening test    Orders:    Hepatitis C Antibody; Future    Mass of finger of left hand  Will follow up with an orthopedist out of [facility], can't get surgery out of [facility]'s   Suspect ganglion cyst         Declined flu vaccine    Return in about 6 months (around 2025) for Annual physical.     History of Present Illness     She has been watching her diet.  Her Hemoglobin A1c was 6.3 at her last visit.  She is no longer going to see endocrinology.  Her sugars have improved and she is not on any medication.  Also the co-pay is more expensive    She has been having pain in her legs. She feels like she tore something in her left groin for the past 6 weeks.  She denies any trauma  She has not had her period in a year.       Review of Systems       Objective   /70   Pulse 75   Temp (!) 97.3 °F (36.3 °C)   Resp 16   Ht 5' 2\" (1.575 m)   Wt 78.5 kg (173 lb)   SpO2 98%   BMI 31.64 kg/m²      Physical Exam  Vitals and nursing note reviewed.   Constitutional:       General: She is not " in acute distress.     Appearance: She is well-developed.   HENT:      Head: Normocephalic and atraumatic.      Right Ear: Tympanic membrane normal.      Left Ear: Tympanic membrane normal.   Cardiovascular:      Rate and Rhythm: Normal rate and regular rhythm.      Heart sounds: No murmur heard.  Pulmonary:      Effort: Pulmonary effort is normal. No respiratory distress.      Breath sounds: Normal breath sounds.   Musculoskeletal:         General: Tenderness (Left proximal thigh increase with external rotation of hip) present.      Cervical back: Neck supple.      Comments: Small round mass in left hand over base of index finger.  Mass moves with flexion of finger.   Neurological:      Mental Status: She is alert.            [Patient Intake Form Reviewed] : Patient intake form was reviewed [Fatigue] : fatigue [Joint Pain] : joint pain [Anxiety] : anxiety [Negative] : Allergic/Immunologic [Gait Disturbance] : no gait disturbance [Difficulty Walking] : no difficulty walking [Depression] : no depression [FreeTextEntry2] : mild fatigue since Surgery [FreeTextEntry3] : glasses [FreeTextEntry5] : HTN [FreeTextEntry9] : disc disease/ spinal stenosis/  [de-identified] : disc disease/ spinal stenosis/